# Patient Record
Sex: FEMALE | Race: BLACK OR AFRICAN AMERICAN | NOT HISPANIC OR LATINO
[De-identification: names, ages, dates, MRNs, and addresses within clinical notes are randomized per-mention and may not be internally consistent; named-entity substitution may affect disease eponyms.]

---

## 2017-04-19 PROBLEM — Z00.00 ENCOUNTER FOR PREVENTIVE HEALTH EXAMINATION: Status: ACTIVE | Noted: 2017-04-19

## 2017-04-21 ENCOUNTER — APPOINTMENT (OUTPATIENT)
Dept: PULMONOLOGY | Facility: CLINIC | Age: 52
End: 2017-04-21

## 2017-05-09 ENCOUNTER — APPOINTMENT (OUTPATIENT)
Dept: OPHTHALMOLOGY | Facility: CLINIC | Age: 52
End: 2017-05-09

## 2018-08-30 ENCOUNTER — APPOINTMENT (OUTPATIENT)
Dept: PULMONOLOGY | Facility: CLINIC | Age: 53
End: 2018-08-30
Payer: COMMERCIAL

## 2018-08-30 VITALS
TEMPERATURE: 97.8 F | BODY MASS INDEX: 48.82 KG/M2 | DIASTOLIC BLOOD PRESSURE: 80 MMHG | HEIGHT: 65 IN | SYSTOLIC BLOOD PRESSURE: 130 MMHG | OXYGEN SATURATION: 92 % | WEIGHT: 293 LBS | RESPIRATION RATE: 16 BRPM | HEART RATE: 91 BPM

## 2018-08-30 DIAGNOSIS — Z87.09 PERSONAL HISTORY OF OTHER DISEASES OF THE RESPIRATORY SYSTEM: ICD-10-CM

## 2018-08-30 DIAGNOSIS — Z86.39 PERSONAL HISTORY OF OTHER ENDOCRINE, NUTRITIONAL AND METABOLIC DISEASE: ICD-10-CM

## 2018-08-30 DIAGNOSIS — M54.9 DORSALGIA, UNSPECIFIED: ICD-10-CM

## 2018-08-30 PROCEDURE — 94727 GAS DIL/WSHOT DETER LNG VOL: CPT

## 2018-08-30 PROCEDURE — 94060 EVALUATION OF WHEEZING: CPT

## 2018-08-30 PROCEDURE — 94729 DIFFUSING CAPACITY: CPT

## 2018-08-30 PROCEDURE — 99204 OFFICE O/P NEW MOD 45 MIN: CPT | Mod: 25

## 2018-08-31 PROBLEM — Z86.39 HISTORY OF TYPE 2 DIABETES MELLITUS: Status: RESOLVED | Noted: 2018-08-31 | Resolved: 2018-08-31

## 2018-08-31 PROBLEM — Z87.09 HISTORY OF ASTHMA: Status: RESOLVED | Noted: 2018-08-31 | Resolved: 2018-08-31

## 2018-08-31 PROBLEM — M54.9 BACK PAIN: Status: ACTIVE | Noted: 2018-08-31

## 2018-08-31 RX ORDER — PEN NEEDLE, DIABETIC 29 G X1/2"
31G X 8 MM NEEDLE, DISPOSABLE MISCELLANEOUS
Qty: 270 | Refills: 0 | Status: ACTIVE | COMMUNITY
Start: 2018-04-13

## 2018-08-31 RX ORDER — TRAMADOL HYDROCHLORIDE 50 MG/1
50 TABLET, COATED ORAL
Qty: 30 | Refills: 0 | Status: ACTIVE | COMMUNITY
Start: 2018-02-28

## 2018-08-31 RX ORDER — CICLOPIROX 80 MG/ML
8 SOLUTION TOPICAL
Qty: 6 | Refills: 0 | Status: ACTIVE | COMMUNITY
Start: 2018-08-08

## 2018-08-31 RX ORDER — INSULIN GLARGINE 100 [IU]/ML
100 INJECTION, SOLUTION SUBCUTANEOUS
Qty: 45 | Refills: 0 | Status: ACTIVE | COMMUNITY
Start: 2018-04-13

## 2018-08-31 RX ORDER — LABETALOL HYDROCHLORIDE 200 MG/1
200 TABLET, FILM COATED ORAL
Qty: 240 | Refills: 0 | Status: COMPLETED | COMMUNITY
Start: 2018-08-06

## 2018-08-31 RX ORDER — ERGOCALCIFEROL 1.25 MG/1
1.25 MG CAPSULE, LIQUID FILLED ORAL
Qty: 12 | Refills: 0 | Status: ACTIVE | COMMUNITY
Start: 2018-04-16

## 2018-08-31 RX ORDER — SITAGLIPTIN 100 MG/1
100 TABLET, FILM COATED ORAL
Qty: 90 | Refills: 0 | Status: ACTIVE | COMMUNITY
Start: 2018-04-13

## 2018-08-31 RX ORDER — ATORVASTATIN CALCIUM 40 MG/1
40 TABLET, FILM COATED ORAL
Qty: 30 | Refills: 0 | Status: ACTIVE | COMMUNITY
Start: 2018-08-06

## 2018-08-31 RX ORDER — HYDROCORTISONE 1 %
12 CREAM (GRAM) TOPICAL
Qty: 400 | Refills: 0 | Status: COMPLETED | COMMUNITY
Start: 2018-08-08

## 2018-08-31 RX ORDER — CLONIDINE 0.3 MG/24H
0.3 PATCH, EXTENDED RELEASE TRANSDERMAL
Qty: 12 | Refills: 0 | Status: ACTIVE | COMMUNITY
Start: 2018-04-13

## 2018-08-31 RX ORDER — POTASSIUM CHLORIDE 1500 MG/1
20 TABLET, EXTENDED RELEASE ORAL
Qty: 90 | Refills: 0 | Status: ACTIVE | COMMUNITY
Start: 2018-04-13

## 2018-08-31 RX ORDER — TELMISARTAN 80 MG/1
80 TABLET ORAL
Qty: 90 | Refills: 0 | Status: ACTIVE | COMMUNITY
Start: 2018-04-13

## 2018-09-17 ENCOUNTER — CLINICAL ADVICE (OUTPATIENT)
Age: 53
End: 2018-09-17

## 2019-01-27 ENCOUNTER — RX RENEWAL (OUTPATIENT)
Age: 54
End: 2019-01-27

## 2019-04-05 ENCOUNTER — APPOINTMENT (OUTPATIENT)
Dept: PULMONOLOGY | Facility: CLINIC | Age: 54
End: 2019-04-05
Payer: COMMERCIAL

## 2019-04-05 VITALS
BODY MASS INDEX: 50.26 KG/M2 | RESPIRATION RATE: 16 BRPM | WEIGHT: 293 LBS | DIASTOLIC BLOOD PRESSURE: 100 MMHG | SYSTOLIC BLOOD PRESSURE: 174 MMHG | HEART RATE: 92 BPM | TEMPERATURE: 99.1 F | OXYGEN SATURATION: 91 %

## 2019-04-05 PROCEDURE — 99215 OFFICE O/P EST HI 40 MIN: CPT | Mod: 25

## 2019-04-05 PROCEDURE — 94727 GAS DIL/WSHOT DETER LNG VOL: CPT

## 2019-04-05 PROCEDURE — 94729 DIFFUSING CAPACITY: CPT

## 2019-04-05 PROCEDURE — 94060 EVALUATION OF WHEEZING: CPT

## 2019-04-05 RX ORDER — ALBUTEROL SULFATE 90 UG/1
108 (90 BASE) AEROSOL, METERED RESPIRATORY (INHALATION) EVERY 6 HOURS
Qty: 1 | Refills: 5 | Status: ACTIVE | COMMUNITY
Start: 2019-04-05 | End: 1900-01-01

## 2019-04-05 NOTE — PROCEDURE
[FreeTextEntry1] : Pulmonary function testing was performed on 8/30/18. There was no obstruction. There was moderate restriction. In addition there was a reduction in diffusion which was in relation to the loss of lung volume. No significant change was noted after inhalation of albuterol.\par \par Pulmonary function test performed April 5, 2019. No obstruction noted. Moderate restriction was present. Diffusion was reduced in relation to the loss of lung volume. No significant changes noted after inhalation of bronchodilator.\par \par Chest CT performed on September 26, 2018 demonstrated cardiomegaly, coronary artery calcifications, ectasia of the ascending aorta and ground glass densities in the right lung.\par \par Chest radiograph from March 28, 2019 reported a mild CHF pattern.

## 2019-04-05 NOTE — PHYSICAL EXAM
[General Appearance - In No Acute Distress] : no acute distress [Heart Sounds] : normal S1 and S2 [Auscultation Breath Sounds / Voice Sounds] : lungs were clear to auscultation bilaterally [Abdomen Tenderness] : non-tender [Abnormal Walk] : normal gait [Nail Clubbing] : no clubbing of the fingernails [Cyanosis, Localized] : no localized cyanosis [No Focal Deficits] : no focal deficits [Oriented To Time, Place, And Person] : oriented to person, place, and time [Impaired Insight] : insight and judgment were intact [Neck Cervical Mass (___cm)] : no neck mass was observed [3+ Pitting] : 3+  pitting  [Skin Turgor] : normal skin turgor [Erythema] : no erythema of the pharynx

## 2019-04-05 NOTE — HISTORY OF PRESENT ILLNESS
[FreeTextEntry1] : She was recently hospitalized in [place] for congestive heart failure. She received intravenous diuresis and responded well. No longer feeling short of breath but does have a cough when she lies down at night. Cough is nonproductive. No constitutional symptoms. No chest pain, pressure or palpitations. Continues to have mild leg edema.

## 2019-04-05 NOTE — DISCUSSION/SUMMARY
[FreeTextEntry1] : She is a 53 year-old woman with a history of CHF, HTN, DM, obesity and asthma.\par \par Her asthma is not active at the present time.For her asthma she is to continue with Breo and Ventolin.\par \par She was recently hospitalized for congestive heart failure. The radiographic findings noted on her chest CT require followup. Interstitial lung disease needs to be ruled out. She was advised to follow up with her cardiologist Dr. Randolph in [place]. I will see her again after the chest CT findings are available.

## 2019-04-05 NOTE — REVIEW OF SYSTEMS
[Back Pain] : ~T back pain [Diabetes] : diabetes mellitus [DVT] : DVT [Cough] : cough [PND] : PND [Fever] : no fever [Chills] : no chills [Sputum] : not coughing up ~M sputum [Hemoptysis] : no hemoptysis [Dyspnea] : no dyspnea [Chest Tightness] : no chest tightness [Pleuritic Pain] : no pleuritic pain [Wheezing] : no wheezing [Chest Discomfort] : no chest discomfort [Palpitations] : no palpitations [Edema] : ~T edema was not present [Nasal Discharge] : no nasal discharge [Heartburn] : no heartburn [Reflux] : no reflux [Dysuria] : no dysuria [Rash] : no [unfilled] rash [Anemia] : no anemia [Thyroid Problem] : no thyroid problem [Snoring] : no snoring

## 2019-07-18 ENCOUNTER — APPOINTMENT (OUTPATIENT)
Dept: PULMONOLOGY | Facility: CLINIC | Age: 54
End: 2019-07-18

## 2019-08-12 ENCOUNTER — APPOINTMENT (OUTPATIENT)
Dept: OPHTHALMOLOGY | Facility: CLINIC | Age: 54
End: 2019-08-12
Payer: COMMERCIAL

## 2019-08-12 ENCOUNTER — NON-APPOINTMENT (OUTPATIENT)
Age: 54
End: 2019-08-12

## 2019-08-12 PROCEDURE — 92250 FUNDUS PHOTOGRAPHY W/I&R: CPT

## 2019-08-12 PROCEDURE — 92014 COMPRE OPH EXAM EST PT 1/>: CPT

## 2019-08-12 PROCEDURE — 92015 DETERMINE REFRACTIVE STATE: CPT

## 2019-10-21 ENCOUNTER — APPOINTMENT (OUTPATIENT)
Dept: OPHTHALMOLOGY | Facility: CLINIC | Age: 54
End: 2019-10-21

## 2019-10-28 ENCOUNTER — APPOINTMENT (OUTPATIENT)
Dept: OPHTHALMOLOGY | Facility: CLINIC | Age: 54
End: 2019-10-28

## 2020-03-20 ENCOUNTER — APPOINTMENT (OUTPATIENT)
Dept: PULMONOLOGY | Facility: CLINIC | Age: 55
End: 2020-03-20
Payer: COMMERCIAL

## 2020-03-20 VITALS
RESPIRATION RATE: 16 BRPM | HEART RATE: 92 BPM | SYSTOLIC BLOOD PRESSURE: 148 MMHG | TEMPERATURE: 98.5 F | DIASTOLIC BLOOD PRESSURE: 81 MMHG | OXYGEN SATURATION: 93 %

## 2020-03-20 PROCEDURE — 99214 OFFICE O/P EST MOD 30 MIN: CPT

## 2020-03-20 RX ORDER — LABETALOL HYDROCHLORIDE 300 MG/1
300 TABLET, FILM COATED ORAL
Qty: 360 | Refills: 0 | Status: COMPLETED | COMMUNITY
Start: 2018-04-13 | End: 2020-03-20

## 2020-03-20 RX ORDER — ALBUTEROL SULFATE 90 UG/1
108 (90 BASE) AEROSOL, METERED RESPIRATORY (INHALATION) EVERY 6 HOURS
Qty: 1 | Refills: 2 | Status: COMPLETED | COMMUNITY
Start: 2019-01-27 | End: 2020-03-20

## 2020-03-20 RX ORDER — FUROSEMIDE 80 MG/1
80 TABLET ORAL
Qty: 90 | Refills: 0 | Status: COMPLETED | COMMUNITY
Start: 2018-04-18 | End: 2020-03-20

## 2020-03-20 RX ORDER — HYDRALAZINE HYDROCHLORIDE 100 MG/1
100 TABLET ORAL
Qty: 270 | Refills: 0 | Status: COMPLETED | COMMUNITY
Start: 2018-04-13 | End: 2020-03-20

## 2020-03-20 NOTE — HISTORY OF PRESENT ILLNESS
[FreeTextEntry1] : She was recently hospitalized in [place] for congestive heart failure. She received intravenous diuresis and responded well. No longer feeling short of breath but does have a cough when she lies down at night. Cough is nonproductive. No constitutional symptoms. No chest pain, pressure or palpitations. Continues to have mild leg edema.\par \par ==\par \par Was recently in the hospital for SOB and CHF. Her medications were adjusted. Was advised to have follow up for sleep apnea. She has a history of PRECIOUS. She had a CPAP machine in the past.

## 2020-03-20 NOTE — PHYSICAL EXAM
[General Appearance - In No Acute Distress] : no acute distress [Neck Cervical Mass (___cm)] : no neck mass was observed [Heart Sounds] : normal S1 and S2 [Auscultation Breath Sounds / Voice Sounds] : lungs were clear to auscultation bilaterally [Abdomen Tenderness] : non-tender [Abnormal Walk] : normal gait [Nail Clubbing] : no clubbing of the fingernails (4) walks frequently [Cyanosis, Localized] : no localized cyanosis [3+ Pitting] : 3+  pitting  [Skin Turgor] : normal skin turgor [No Focal Deficits] : no focal deficits [Oriented To Time, Place, And Person] : oriented to person, place, and time [Impaired Insight] : insight and judgment were intact [Erythema] : no erythema of the pharynx

## 2020-03-20 NOTE — REVIEW OF SYSTEMS
[Cough] : cough [PND] : PND [Back Pain] : ~T back pain [Diabetes] : diabetes mellitus [DVT] : DVT [Snoring] : snoring [Witnessed Apneas] : demonstrated ~M apnea [Nonrestorative Sleep] : nonrestorative sleep [Hypersomnolence] : sleeping much more than usual [Awakes With Dry Mouth] : awakes with dry mouth [Fever] : no fever [Chills] : no chills [Sputum] : not coughing up ~M sputum [Hemoptysis] : no hemoptysis [Dyspnea] : no dyspnea [Chest Tightness] : no chest tightness [Pleuritic Pain] : no pleuritic pain [Wheezing] : no wheezing [Chest Discomfort] : no chest discomfort [Palpitations] : no palpitations [Edema] : ~T edema was not present [Nasal Discharge] : no nasal discharge [Heartburn] : no heartburn [Reflux] : no reflux [Dysuria] : no dysuria [Rash] : no [unfilled] rash [Anemia] : no anemia [Thyroid Problem] : no thyroid problem [Difficulty Maintaining Sleep] : no difficulty maintaining sleep [Awakes With Headache] : awakes without a headache

## 2020-03-20 NOTE — DISCUSSION/SUMMARY
[FreeTextEntry1] : She is a 54 year-old woman with a history of CHF, HTN, DM, obesity and asthma. Also has a history of PRECIOUS. Also has had ground glass opacities on Chest CT. \par \par Has had CPAP but not using it. Her machine is old and not in working order. \par \par Will get a sleep study now. \par \par Follow up Chest CT. \par \par Follow up in one month.

## 2020-03-20 NOTE — PROCEDURE
[FreeTextEntry1] : PFT 8/30/18: There was no obstruction. There was moderate restriction. In addition there was a reduction in diffusion which was in relation to the loss of lung volume. No significant change was noted after inhalation of albuterol.\par \par PFT 4/5/19: No obstruction noted. Moderate restriction was present. Diffusion was reduced in relation to the loss of lung volume. No significant changes noted after inhalation of bronchodilator.\par \par CT Chest 9/26/18: Demonstrated cardiomegaly, coronary artery calcifications, ectasia of the ascending aorta and ground glass densities in the right lung.\par \par Chest radiograph from March 28, 2019 reported a mild CHF pattern.

## 2020-03-20 NOTE — REASON FOR VISIT
[Consultation] : a consultation visit [Asthma] : asthma [Follow-Up - From Hospitalization] : a follow-up visit after a recent hospitalization

## 2020-04-13 ENCOUNTER — APPOINTMENT (OUTPATIENT)
Dept: PULMONOLOGY | Facility: CLINIC | Age: 55
End: 2020-04-13
Payer: COMMERCIAL

## 2020-04-13 VITALS
BODY MASS INDEX: 48.82 KG/M2 | RESPIRATION RATE: 16 BRPM | HEART RATE: 95 BPM | TEMPERATURE: 98.2 F | DIASTOLIC BLOOD PRESSURE: 71 MMHG | SYSTOLIC BLOOD PRESSURE: 148 MMHG | WEIGHT: 293 LBS | HEIGHT: 65 IN | OXYGEN SATURATION: 91 %

## 2020-04-13 VITALS — OXYGEN SATURATION: 98 %

## 2020-04-13 PROCEDURE — 99215 OFFICE O/P EST HI 40 MIN: CPT

## 2020-04-13 RX ORDER — TRIAMTERENE AND HYDROCHLOROTHIAZIDE 25; 37.5 MG/1; MG/1
37.5-25 TABLET ORAL
Refills: 0 | Status: COMPLETED | COMMUNITY
End: 2020-04-13

## 2020-04-13 RX ORDER — INSULIN HUMAN 100 [IU]/ML
(70-30) 100 INJECTION, SUSPENSION SUBCUTANEOUS
Qty: 30 | Refills: 0 | Status: COMPLETED | COMMUNITY
Start: 2017-07-18 | End: 2020-04-13

## 2020-04-13 RX ORDER — HYDRALAZINE HYDROCHLORIDE 50 MG/1
50 TABLET ORAL
Refills: 0 | Status: ACTIVE | COMMUNITY

## 2020-04-13 RX ORDER — LABETALOL HYDROCHLORIDE 300 MG/1
300 TABLET, FILM COATED ORAL
Refills: 0 | Status: ACTIVE | COMMUNITY

## 2020-04-13 RX ORDER — LABETALOL HYDROCHLORIDE 100 MG/1
100 TABLET, FILM COATED ORAL
Refills: 0 | Status: COMPLETED | COMMUNITY
End: 2020-04-13

## 2020-04-13 RX ORDER — HYDRALAZINE HYDROCHLORIDE 25 MG/1
25 TABLET ORAL
Refills: 0 | Status: COMPLETED | COMMUNITY
End: 2020-04-13

## 2020-04-13 RX ORDER — FUROSEMIDE 80 MG/1
80 TABLET ORAL
Refills: 0 | Status: ACTIVE | COMMUNITY

## 2020-04-13 NOTE — HISTORY OF PRESENT ILLNESS
[FreeTextEntry1] : She was hospitalized at [facility] on 3/26/20 for hypertensive emergency with CHF. Was COVID negative. Her medications were adjusted. \par \par Has not had a PSG as of yet. She has a history of PRECIOUS. She had a CPAP machine in the past.

## 2020-04-13 NOTE — REVIEW OF SYSTEMS
[Cough] : cough [PND] : PND [Back Pain] : ~T back pain [Diabetes] : diabetes mellitus [DVT] : DVT [Snoring] : snoring [Witnessed Apneas] : demonstrated ~M apnea [Nonrestorative Sleep] : nonrestorative sleep [Awakes With Dry Mouth] : awakes with dry mouth [Hypersomnolence] : sleeping much more than usual [Fever] : no fever [Chills] : no chills [Sputum] : not coughing up ~M sputum [Hemoptysis] : no hemoptysis [Dyspnea] : no dyspnea [Chest Tightness] : no chest tightness [Pleuritic Pain] : no pleuritic pain [Wheezing] : no wheezing [Chest Discomfort] : no chest discomfort [Palpitations] : no palpitations [Edema] : ~T edema was not present [Nasal Discharge] : no nasal discharge [Heartburn] : no heartburn [Reflux] : no reflux [Dysuria] : no dysuria [Rash] : no [unfilled] rash [Anemia] : no anemia [Thyroid Problem] : no thyroid problem [Difficulty Maintaining Sleep] : no difficulty maintaining sleep [Awakes With Headache] : awakes without a headache

## 2020-04-13 NOTE — REASON FOR VISIT
[Follow-Up - From Hospitalization] : a follow-up visit after a recent hospitalization [Follow-Up] : a follow-up visit [Sleep Apnea] : sleep apnea

## 2020-04-13 NOTE — PROCEDURE
[FreeTextEntry1] : PFT 8/30/18: There was no obstruction. There was moderate restriction. In addition there was a reduction in diffusion which was in relation to the loss of lung volume. No significant change was noted after inhalation of albuterol.\par \par PFT 4/5/19: No obstruction noted. Moderate restriction was present. Diffusion was reduced in relation to the loss of lung volume. No significant changes noted after inhalation of bronchodilator.\par \par Chest radiograph from March 28, 2019 reported a mild CHF pattern.\par \par CT Chest 9/26/18: Demonstrated cardiomegaly, coronary artery calcifications, ectasia of the ascending aorta and ground glass densities in the right lung.\par \par CT Chest (angiogram) - 3/26/20: No PE. Patchy ground glass nodular opacities in the RLL and LLL. There were also some solid appearing nodules consistent with intraparenchymal lymph nodes. No pleural effusion. \par \par Echocardiogram - 3/36/20: EF 45-50%. Mild diffuse hypokinesis. Concentric LVH. Grade I diastolic dysfunction. RVSP could not be assessed. \par \par

## 2020-04-13 NOTE — DISCUSSION/SUMMARY
[FreeTextEntry1] : She is a 54 year-old woman with a history of CHF, HTN, DM, HFpEF, obesity, asthma and PRECIOUS. Has not been on CPAP. She has a machine but it is not working. She also has had ground glass opacities on Chest CT as noted. She was hospitalized in March 2020 for a hypertensive emergency and CHF. PE was ruled out. \par \par Her BP is now better. To continue with the current medications and follow up with Cardiology. \par \par For her PRECIOUS a follow up sleep study is awaited. Has not scheduled it as of yet. \par \par For the GGO on Chest CT a follow up CT of the Chest in 6 months. \par \par Weight loss suggested. Advised to consider bariatric surgery. Has been evaluated in the past but turned down. \par \par Follow up with me after the sleep study has been obtained.

## 2020-04-13 NOTE — PHYSICAL EXAM
[General Appearance - In No Acute Distress] : no acute distress [Neck Cervical Mass (___cm)] : no neck mass was observed [Heart Sounds] : normal S1 and S2 [Auscultation Breath Sounds / Voice Sounds] : lungs were clear to auscultation bilaterally [Abdomen Tenderness] : non-tender [Abnormal Walk] : normal gait [Nail Clubbing] : no clubbing of the fingernails [Cyanosis, Localized] : no localized cyanosis [3+ Pitting] : 3+  pitting  [Skin Turgor] : normal skin turgor [No Focal Deficits] : no focal deficits [Oriented To Time, Place, And Person] : oriented to person, place, and time [Impaired Insight] : insight and judgment were intact [Normal Oropharynx] : normal oropharynx [Erythema] : no erythema of the pharynx

## 2020-04-30 ENCOUNTER — APPOINTMENT (OUTPATIENT)
Dept: PULMONOLOGY | Facility: CLINIC | Age: 55
End: 2020-04-30

## 2020-11-09 ENCOUNTER — APPOINTMENT (OUTPATIENT)
Dept: PULMONOLOGY | Facility: CLINIC | Age: 55
End: 2020-11-09
Payer: COMMERCIAL

## 2020-11-09 VITALS
DIASTOLIC BLOOD PRESSURE: 88 MMHG | BODY MASS INDEX: 52.09 KG/M2 | RESPIRATION RATE: 17 BRPM | WEIGHT: 293 LBS | TEMPERATURE: 97 F | OXYGEN SATURATION: 94 % | SYSTOLIC BLOOD PRESSURE: 180 MMHG | HEART RATE: 91 BPM

## 2020-11-09 DIAGNOSIS — Z86.69 PERSONAL HISTORY OF OTHER DISEASES OF THE NERVOUS SYSTEM AND SENSE ORGANS: ICD-10-CM

## 2020-11-09 DIAGNOSIS — G47.33 OBSTRUCTIVE SLEEP APNEA (ADULT) (PEDIATRIC): ICD-10-CM

## 2020-11-09 PROCEDURE — 99214 OFFICE O/P EST MOD 30 MIN: CPT

## 2020-11-09 PROCEDURE — 99072 ADDL SUPL MATRL&STAF TM PHE: CPT

## 2020-11-09 NOTE — REVIEW OF SYSTEMS
[PND] : PND [Back Pain] : ~T back pain [Diabetes] : diabetes mellitus [DVT] : DVT [Snoring] : snoring [Witnessed Apneas] : demonstrated ~M apnea [Nonrestorative Sleep] : nonrestorative sleep [Awakes With Dry Mouth] : awakes with dry mouth [Hypersomnolence] : sleeping much more than usual [Fever] : no fever [Cough] : no cough [Dyspnea] : no dyspnea [Wheezing] : no wheezing [Chest Discomfort] : no chest discomfort [Palpitations] : no palpitations [Edema] : ~T edema was not present [Nasal Discharge] : no nasal discharge [Heartburn] : no heartburn [Reflux] : no reflux [Dysuria] : no dysuria [Rash] : no [unfilled] rash [Anemia] : no anemia [Thyroid Problem] : no thyroid problem [Difficulty Maintaining Sleep] : no difficulty maintaining sleep [Awakes With Headache] : awakes without a headache

## 2020-11-09 NOTE — PHYSICAL EXAM
[General Appearance - In No Acute Distress] : no acute distress [Neck Cervical Mass (___cm)] : no neck mass was observed [Heart Sounds] : normal S1 and S2 [Auscultation Breath Sounds / Voice Sounds] : lungs were clear to auscultation bilaterally [Abdomen Tenderness] : non-tender [Abnormal Walk] : normal gait [Nail Clubbing] : no clubbing of the fingernails [Cyanosis, Localized] : no localized cyanosis [3+ Pitting] : 3+  pitting  [Skin Turgor] : normal skin turgor [No Focal Deficits] : no focal deficits [Oriented To Time, Place, And Person] : oriented to person, place, and time [IV] : IV

## 2020-11-09 NOTE — DISCUSSION/SUMMARY
[FreeTextEntry1] : She is a 54 year-old woman with a history of CHF, HTN, DM, HFpEF, obesity, asthma and PRECIOUS. Has not been on CPAP. She has had a machine but it is not working. She also has had ground glass opacities on Chest CT as noted. She was hospitalized in March 2020 for a hypertensive emergency and CHF. PE was ruled out. \par \par She has severe PRECIOUS. \par \par Will start on Auto - CPAP of 4 to 12 cm of water.

## 2020-11-09 NOTE — PROCEDURE
[FreeTextEntry1] : PFT 8/30/18: There was no obstruction. There was moderate restriction. In addition there was a reduction in diffusion which was in relation to the loss of lung volume. No significant change was noted after inhalation of albuterol.\par \par PFT 4/5/19: No obstruction noted. Moderate restriction was present. Diffusion was reduced in relation to the loss of lung volume. No significant changes noted after inhalation of bronchodilator.\par \par Chest radiograph from March 28, 2019 reported a mild CHF pattern.\par \par CT Chest 9/26/18: Demonstrated cardiomegaly, coronary artery calcifications, ectasia of the ascending aorta and ground glass densities in the right lung.\par \par CT Chest (angiogram) - 3/26/20: No PE. Patchy ground glass nodular opacities in the RLL and LLL. There were also some solid appearing nodules consistent with intraparenchymal lymph nodes. No pleural effusion. \par \par PSG 10/24/20: Severe PRECIOUS with AHI of 61. \par \par Echocardiogram - 3/36/20: EF 45-50%. Mild diffuse hypokinesis. Concentric LVH. Grade I diastolic dysfunction. RVSP could not be assessed. \par \par

## 2021-02-22 ENCOUNTER — APPOINTMENT (OUTPATIENT)
Dept: PULMONOLOGY | Facility: CLINIC | Age: 56
End: 2021-02-22

## 2021-08-10 ENCOUNTER — APPOINTMENT (OUTPATIENT)
Dept: GASTROENTEROLOGY | Facility: CLINIC | Age: 56
End: 2021-08-10
Payer: COMMERCIAL

## 2021-08-10 VITALS
TEMPERATURE: 98.1 F | SYSTOLIC BLOOD PRESSURE: 158 MMHG | WEIGHT: 293 LBS | HEIGHT: 65 IN | BODY MASS INDEX: 48.82 KG/M2 | DIASTOLIC BLOOD PRESSURE: 90 MMHG

## 2021-08-10 DIAGNOSIS — K59.00 CONSTIPATION, UNSPECIFIED: ICD-10-CM

## 2021-08-10 DIAGNOSIS — K21.9 GASTRO-ESOPHAGEAL REFLUX DISEASE W/OUT ESOPHAGITIS: ICD-10-CM

## 2021-08-10 PROCEDURE — 99204 OFFICE O/P NEW MOD 45 MIN: CPT

## 2021-08-10 RX ORDER — PANTOPRAZOLE 40 MG/1
40 TABLET, DELAYED RELEASE ORAL
Qty: 30 | Refills: 5 | Status: ACTIVE | COMMUNITY
Start: 2021-08-10 | End: 1900-01-01

## 2021-08-10 RX ORDER — LINACLOTIDE 145 UG/1
145 CAPSULE, GELATIN COATED ORAL
Qty: 30 | Refills: 3 | Status: ACTIVE | COMMUNITY
Start: 2021-08-10 | End: 1900-01-01

## 2021-08-10 NOTE — PHYSICAL EXAM
[General Appearance - Alert] : alert [General Appearance - In No Acute Distress] : in no acute distress [Sclera] : the sclera and conjunctiva were normal [PERRL With Normal Accommodation] : pupils were equal in size, round, and reactive to light [Extraocular Movements] : extraocular movements were intact [Outer Ear] : the ears and nose were normal in appearance [Oropharynx] : the oropharynx was normal [Neck Appearance] : the appearance of the neck was normal [Neck Cervical Mass (___cm)] : no neck mass was observed [Jugular Venous Distention Increased] : there was no jugular-venous distention [Thyroid Diffuse Enlargement] : the thyroid was not enlarged [Thyroid Nodule] : there were no palpable thyroid nodules [Auscultation Breath Sounds / Voice Sounds] : lungs were clear to auscultation bilaterally [Abnormal Walk] : normal gait [Nail Clubbing] : no clubbing  or cyanosis of the fingernails [Musculoskeletal - Swelling] : no joint swelling seen [Motor Tone] : muscle strength and tone were normal [Skin Color & Pigmentation] : normal skin color and pigmentation [Skin Turgor] : normal skin turgor [] : no rash [Deep Tendon Reflexes (DTR)] : deep tendon reflexes were 2+ and symmetric [Sensation] : the sensory exam was normal to light touch and pinprick [No Focal Deficits] : no focal deficits

## 2021-08-10 NOTE — CONSULT LETTER
[Dear  ___] : Dear  [unfilled], [Consult Letter:] : I had the pleasure of evaluating your patient, [unfilled]. [( Thank you for referring [unfilled] for consultation for _____ )] : Thank you for referring [unfilled] for consultation for [unfilled] [Please see my note below.] : Please see my note below. [Consult Closing:] : Thank you very much for allowing me to participate in the care of this patient.  If you have any questions, please do not hesitate to contact me. [Sincerely,] : Sincerely, [FreeTextEntry3] : Alonso Mckeon MD\par

## 2021-08-10 NOTE — HISTORY OF PRESENT ILLNESS
[FreeTextEntry1] : She .Is a 55-year-old female with chronic constipation.  Her last colonoscopy was 2014 which was normal.  She also admits to nausea and post prandial reflux of her food.  She denies abdominal pain

## 2021-08-10 NOTE — REVIEW OF SYSTEMS
[Constipation] : constipation [Heartburn] : heartburn [Negative] : Heme/Lymph [FreeTextEntry7] : Reflux Nausea

## 2021-08-10 NOTE — ASSESSMENT
[FreeTextEntry1] : Start Linzess 145 mcg daily in the morning\par Pantoprazole 40 mg daily 1 hour before breakfast\par \par Her high BMI and severe sleep apnea preclude endoscopic evaluation at this time\par \par Office follow-up in 3 to 4 weeks

## 2023-09-25 ENCOUNTER — APPOINTMENT (OUTPATIENT)
Dept: PULMONOLOGY | Facility: CLINIC | Age: 58
End: 2023-09-25
Payer: COMMERCIAL

## 2023-09-25 VITALS
TEMPERATURE: 97.7 F | BODY MASS INDEX: 47.43 KG/M2 | HEART RATE: 103 BPM | OXYGEN SATURATION: 93 % | SYSTOLIC BLOOD PRESSURE: 192 MMHG | DIASTOLIC BLOOD PRESSURE: 110 MMHG | WEIGHT: 285 LBS

## 2023-09-25 VITALS — SYSTOLIC BLOOD PRESSURE: 182 MMHG | DIASTOLIC BLOOD PRESSURE: 102 MMHG

## 2023-09-25 DIAGNOSIS — R06.02 SHORTNESS OF BREATH: ICD-10-CM

## 2023-09-25 DIAGNOSIS — R93.89 ABNORMAL FINDINGS ON DIAGNOSTIC IMAGING OF OTHER SPECIFIED BODY STRUCTURES: ICD-10-CM

## 2023-09-25 DIAGNOSIS — Z99.89 OBSTRUCTIVE SLEEP APNEA (ADULT) (PEDIATRIC): ICD-10-CM

## 2023-09-25 DIAGNOSIS — G47.33 OBSTRUCTIVE SLEEP APNEA (ADULT) (PEDIATRIC): ICD-10-CM

## 2023-09-25 PROCEDURE — 99214 OFFICE O/P EST MOD 30 MIN: CPT

## 2023-10-26 ENCOUNTER — APPOINTMENT (OUTPATIENT)
Dept: PULMONOLOGY | Facility: CLINIC | Age: 58
End: 2023-10-26

## 2024-01-13 ENCOUNTER — INPATIENT (INPATIENT)
Facility: HOSPITAL | Age: 59
LOS: 5 days | Discharge: HOME | End: 2024-01-19
Attending: GENERAL ACUTE CARE HOSPITAL | Admitting: GENERAL ACUTE CARE HOSPITAL
Payer: COMMERCIAL

## 2024-01-13 VITALS
HEIGHT: 67 IN | HEART RATE: 94 BPM | RESPIRATION RATE: 16 BRPM | SYSTOLIC BLOOD PRESSURE: 192 MMHG | WEIGHT: 273.37 LBS | OXYGEN SATURATION: 94 % | DIASTOLIC BLOOD PRESSURE: 128 MMHG | TEMPERATURE: 98 F

## 2024-01-13 LAB
ALBUMIN SERPL ELPH-MCNC: 3.1 G/DL — LOW (ref 3.3–5)
ALBUMIN SERPL ELPH-MCNC: 3.1 G/DL — LOW (ref 3.3–5)
ALP SERPL-CCNC: 93 U/L — SIGNIFICANT CHANGE UP (ref 40–120)
ALP SERPL-CCNC: 93 U/L — SIGNIFICANT CHANGE UP (ref 40–120)
ALT FLD-CCNC: 22 U/L — SIGNIFICANT CHANGE UP (ref 12–78)
ALT FLD-CCNC: 22 U/L — SIGNIFICANT CHANGE UP (ref 12–78)
ANION GAP SERPL CALC-SCNC: 7 MMOL/L — SIGNIFICANT CHANGE UP (ref 5–17)
ANION GAP SERPL CALC-SCNC: 7 MMOL/L — SIGNIFICANT CHANGE UP (ref 5–17)
APPEARANCE UR: CLEAR — SIGNIFICANT CHANGE UP
APPEARANCE UR: CLEAR — SIGNIFICANT CHANGE UP
APTT BLD: 28.6 SEC — SIGNIFICANT CHANGE UP (ref 24.5–35.6)
APTT BLD: 28.6 SEC — SIGNIFICANT CHANGE UP (ref 24.5–35.6)
AST SERPL-CCNC: 17 U/L — SIGNIFICANT CHANGE UP (ref 15–37)
AST SERPL-CCNC: 17 U/L — SIGNIFICANT CHANGE UP (ref 15–37)
BASOPHILS # BLD AUTO: 0.03 K/UL — SIGNIFICANT CHANGE UP (ref 0–0.2)
BASOPHILS # BLD AUTO: 0.03 K/UL — SIGNIFICANT CHANGE UP (ref 0–0.2)
BASOPHILS NFR BLD AUTO: 0.5 % — SIGNIFICANT CHANGE UP (ref 0–2)
BASOPHILS NFR BLD AUTO: 0.5 % — SIGNIFICANT CHANGE UP (ref 0–2)
BILIRUB SERPL-MCNC: 0.5 MG/DL — SIGNIFICANT CHANGE UP (ref 0.2–1.2)
BILIRUB SERPL-MCNC: 0.5 MG/DL — SIGNIFICANT CHANGE UP (ref 0.2–1.2)
BILIRUB UR-MCNC: NEGATIVE — SIGNIFICANT CHANGE UP
BILIRUB UR-MCNC: NEGATIVE — SIGNIFICANT CHANGE UP
BUN SERPL-MCNC: 21 MG/DL — SIGNIFICANT CHANGE UP (ref 7–23)
BUN SERPL-MCNC: 21 MG/DL — SIGNIFICANT CHANGE UP (ref 7–23)
CALCIUM SERPL-MCNC: 8.9 MG/DL — SIGNIFICANT CHANGE UP (ref 8.5–10.1)
CALCIUM SERPL-MCNC: 8.9 MG/DL — SIGNIFICANT CHANGE UP (ref 8.5–10.1)
CHLORIDE SERPL-SCNC: 101 MMOL/L — SIGNIFICANT CHANGE UP (ref 96–108)
CHLORIDE SERPL-SCNC: 101 MMOL/L — SIGNIFICANT CHANGE UP (ref 96–108)
CO2 SERPL-SCNC: 29 MMOL/L — SIGNIFICANT CHANGE UP (ref 22–31)
CO2 SERPL-SCNC: 29 MMOL/L — SIGNIFICANT CHANGE UP (ref 22–31)
COLOR SPEC: YELLOW — SIGNIFICANT CHANGE UP
COLOR SPEC: YELLOW — SIGNIFICANT CHANGE UP
CREAT SERPL-MCNC: 1.14 MG/DL — SIGNIFICANT CHANGE UP (ref 0.5–1.3)
CREAT SERPL-MCNC: 1.14 MG/DL — SIGNIFICANT CHANGE UP (ref 0.5–1.3)
DIFF PNL FLD: NEGATIVE — SIGNIFICANT CHANGE UP
DIFF PNL FLD: NEGATIVE — SIGNIFICANT CHANGE UP
EGFR: 55 ML/MIN/1.73M2 — LOW
EGFR: 55 ML/MIN/1.73M2 — LOW
EOSINOPHIL # BLD AUTO: 0.03 K/UL — SIGNIFICANT CHANGE UP (ref 0–0.5)
EOSINOPHIL # BLD AUTO: 0.03 K/UL — SIGNIFICANT CHANGE UP (ref 0–0.5)
EOSINOPHIL NFR BLD AUTO: 0.5 % — SIGNIFICANT CHANGE UP (ref 0–6)
EOSINOPHIL NFR BLD AUTO: 0.5 % — SIGNIFICANT CHANGE UP (ref 0–6)
GLUCOSE SERPL-MCNC: 532 MG/DL — CRITICAL HIGH (ref 70–99)
GLUCOSE SERPL-MCNC: 532 MG/DL — CRITICAL HIGH (ref 70–99)
GLUCOSE UR QL: >=1000 MG/DL
GLUCOSE UR QL: >=1000 MG/DL
HCT VFR BLD CALC: 45.6 % — HIGH (ref 34.5–45)
HCT VFR BLD CALC: 45.6 % — HIGH (ref 34.5–45)
HGB BLD-MCNC: 15.1 G/DL — SIGNIFICANT CHANGE UP (ref 11.5–15.5)
HGB BLD-MCNC: 15.1 G/DL — SIGNIFICANT CHANGE UP (ref 11.5–15.5)
IMM GRANULOCYTES NFR BLD AUTO: 0.2 % — SIGNIFICANT CHANGE UP (ref 0–0.9)
IMM GRANULOCYTES NFR BLD AUTO: 0.2 % — SIGNIFICANT CHANGE UP (ref 0–0.9)
INR BLD: 0.92 RATIO — SIGNIFICANT CHANGE UP (ref 0.85–1.18)
INR BLD: 0.92 RATIO — SIGNIFICANT CHANGE UP (ref 0.85–1.18)
KETONES UR-MCNC: NEGATIVE MG/DL — SIGNIFICANT CHANGE UP
KETONES UR-MCNC: NEGATIVE MG/DL — SIGNIFICANT CHANGE UP
LEUKOCYTE ESTERASE UR-ACNC: NEGATIVE — SIGNIFICANT CHANGE UP
LEUKOCYTE ESTERASE UR-ACNC: NEGATIVE — SIGNIFICANT CHANGE UP
LYMPHOCYTES # BLD AUTO: 1.86 K/UL — SIGNIFICANT CHANGE UP (ref 1–3.3)
LYMPHOCYTES # BLD AUTO: 1.86 K/UL — SIGNIFICANT CHANGE UP (ref 1–3.3)
LYMPHOCYTES # BLD AUTO: 28.7 % — SIGNIFICANT CHANGE UP (ref 13–44)
LYMPHOCYTES # BLD AUTO: 28.7 % — SIGNIFICANT CHANGE UP (ref 13–44)
MCHC RBC-ENTMCNC: 28.7 PG — SIGNIFICANT CHANGE UP (ref 27–34)
MCHC RBC-ENTMCNC: 28.7 PG — SIGNIFICANT CHANGE UP (ref 27–34)
MCHC RBC-ENTMCNC: 33.1 G/DL — SIGNIFICANT CHANGE UP (ref 32–36)
MCHC RBC-ENTMCNC: 33.1 G/DL — SIGNIFICANT CHANGE UP (ref 32–36)
MCV RBC AUTO: 86.5 FL — SIGNIFICANT CHANGE UP (ref 80–100)
MCV RBC AUTO: 86.5 FL — SIGNIFICANT CHANGE UP (ref 80–100)
MONOCYTES # BLD AUTO: 0.44 K/UL — SIGNIFICANT CHANGE UP (ref 0–0.9)
MONOCYTES # BLD AUTO: 0.44 K/UL — SIGNIFICANT CHANGE UP (ref 0–0.9)
MONOCYTES NFR BLD AUTO: 6.8 % — SIGNIFICANT CHANGE UP (ref 2–14)
MONOCYTES NFR BLD AUTO: 6.8 % — SIGNIFICANT CHANGE UP (ref 2–14)
NEUTROPHILS # BLD AUTO: 4.1 K/UL — SIGNIFICANT CHANGE UP (ref 1.8–7.4)
NEUTROPHILS # BLD AUTO: 4.1 K/UL — SIGNIFICANT CHANGE UP (ref 1.8–7.4)
NEUTROPHILS NFR BLD AUTO: 63.3 % — SIGNIFICANT CHANGE UP (ref 43–77)
NEUTROPHILS NFR BLD AUTO: 63.3 % — SIGNIFICANT CHANGE UP (ref 43–77)
NITRITE UR-MCNC: NEGATIVE — SIGNIFICANT CHANGE UP
NITRITE UR-MCNC: NEGATIVE — SIGNIFICANT CHANGE UP
NRBC # BLD: 0 /100 WBCS — SIGNIFICANT CHANGE UP (ref 0–0)
NRBC # BLD: 0 /100 WBCS — SIGNIFICANT CHANGE UP (ref 0–0)
PH UR: 6 — SIGNIFICANT CHANGE UP (ref 5–8)
PH UR: 6 — SIGNIFICANT CHANGE UP (ref 5–8)
PLATELET # BLD AUTO: 227 K/UL — SIGNIFICANT CHANGE UP (ref 150–400)
PLATELET # BLD AUTO: 227 K/UL — SIGNIFICANT CHANGE UP (ref 150–400)
POTASSIUM SERPL-MCNC: 4.2 MMOL/L — SIGNIFICANT CHANGE UP (ref 3.5–5.3)
POTASSIUM SERPL-MCNC: 4.2 MMOL/L — SIGNIFICANT CHANGE UP (ref 3.5–5.3)
POTASSIUM SERPL-SCNC: 4.2 MMOL/L — SIGNIFICANT CHANGE UP (ref 3.5–5.3)
POTASSIUM SERPL-SCNC: 4.2 MMOL/L — SIGNIFICANT CHANGE UP (ref 3.5–5.3)
PROT SERPL-MCNC: 6.8 GM/DL — SIGNIFICANT CHANGE UP (ref 6–8.3)
PROT SERPL-MCNC: 6.8 GM/DL — SIGNIFICANT CHANGE UP (ref 6–8.3)
PROT UR-MCNC: 100 MG/DL
PROT UR-MCNC: 100 MG/DL
PROTHROM AB SERPL-ACNC: 11.1 SEC — SIGNIFICANT CHANGE UP (ref 9.5–13)
PROTHROM AB SERPL-ACNC: 11.1 SEC — SIGNIFICANT CHANGE UP (ref 9.5–13)
RBC # BLD: 5.27 M/UL — HIGH (ref 3.8–5.2)
RBC # BLD: 5.27 M/UL — HIGH (ref 3.8–5.2)
RBC # FLD: 12.6 % — SIGNIFICANT CHANGE UP (ref 10.3–14.5)
RBC # FLD: 12.6 % — SIGNIFICANT CHANGE UP (ref 10.3–14.5)
RBC CASTS # UR COMP ASSIST: 3 /HPF — SIGNIFICANT CHANGE UP (ref 0–4)
RBC CASTS # UR COMP ASSIST: 3 /HPF — SIGNIFICANT CHANGE UP (ref 0–4)
SODIUM SERPL-SCNC: 137 MMOL/L — SIGNIFICANT CHANGE UP (ref 135–145)
SODIUM SERPL-SCNC: 137 MMOL/L — SIGNIFICANT CHANGE UP (ref 135–145)
SP GR SPEC: >1.03 — HIGH (ref 1–1.03)
SP GR SPEC: >1.03 — HIGH (ref 1–1.03)
TROPONIN I, HIGH SENSITIVITY RESULT: 91.7 NG/L — HIGH
TROPONIN I, HIGH SENSITIVITY RESULT: 91.7 NG/L — HIGH
UROBILINOGEN FLD QL: 0.2 MG/DL — SIGNIFICANT CHANGE UP (ref 0.2–1)
UROBILINOGEN FLD QL: 0.2 MG/DL — SIGNIFICANT CHANGE UP (ref 0.2–1)
WBC # BLD: 6.47 K/UL — SIGNIFICANT CHANGE UP (ref 3.8–10.5)
WBC # BLD: 6.47 K/UL — SIGNIFICANT CHANGE UP (ref 3.8–10.5)
WBC # FLD AUTO: 6.47 K/UL — SIGNIFICANT CHANGE UP (ref 3.8–10.5)
WBC # FLD AUTO: 6.47 K/UL — SIGNIFICANT CHANGE UP (ref 3.8–10.5)
WBC UR QL: 5 /HPF — SIGNIFICANT CHANGE UP (ref 0–5)
WBC UR QL: 5 /HPF — SIGNIFICANT CHANGE UP (ref 0–5)

## 2024-01-13 PROCEDURE — 70450 CT HEAD/BRAIN W/O DYE: CPT | Mod: 26,MA,77

## 2024-01-13 PROCEDURE — 99285 EMERGENCY DEPT VISIT HI MDM: CPT

## 2024-01-13 PROCEDURE — 70450 CT HEAD/BRAIN W/O DYE: CPT | Mod: 26,MA

## 2024-01-13 PROCEDURE — 99222 1ST HOSP IP/OBS MODERATE 55: CPT

## 2024-01-13 PROCEDURE — 93010 ELECTROCARDIOGRAM REPORT: CPT

## 2024-01-13 RX ORDER — ONDANSETRON 8 MG/1
4 TABLET, FILM COATED ORAL EVERY 8 HOURS
Refills: 0 | Status: DISCONTINUED | OUTPATIENT
Start: 2024-01-13 | End: 2024-01-19

## 2024-01-13 RX ORDER — METOCLOPRAMIDE HCL 10 MG
10 TABLET ORAL ONCE
Refills: 0 | Status: COMPLETED | OUTPATIENT
Start: 2024-01-13 | End: 2024-01-13

## 2024-01-13 RX ORDER — MORPHINE SULFATE 50 MG/1
4 CAPSULE, EXTENDED RELEASE ORAL ONCE
Refills: 0 | Status: DISCONTINUED | OUTPATIENT
Start: 2024-01-13 | End: 2024-01-13

## 2024-01-13 RX ORDER — ACETAMINOPHEN 500 MG
1000 TABLET ORAL ONCE
Refills: 0 | Status: COMPLETED | OUTPATIENT
Start: 2024-01-13 | End: 2024-01-13

## 2024-01-13 RX ORDER — SODIUM CHLORIDE 9 MG/ML
1000 INJECTION INTRAMUSCULAR; INTRAVENOUS; SUBCUTANEOUS ONCE
Refills: 0 | Status: COMPLETED | OUTPATIENT
Start: 2024-01-13 | End: 2024-01-13

## 2024-01-13 RX ORDER — INSULIN HUMAN 100 [IU]/ML
5 INJECTION, SOLUTION SUBCUTANEOUS ONCE
Refills: 0 | Status: DISCONTINUED | OUTPATIENT
Start: 2024-01-13 | End: 2024-01-13

## 2024-01-13 RX ORDER — LANOLIN ALCOHOL/MO/W.PET/CERES
3 CREAM (GRAM) TOPICAL AT BEDTIME
Refills: 0 | Status: DISCONTINUED | OUTPATIENT
Start: 2024-01-13 | End: 2024-01-19

## 2024-01-13 RX ORDER — LEVETIRACETAM 250 MG/1
500 TABLET, FILM COATED ORAL
Refills: 0 | Status: DISCONTINUED | OUTPATIENT
Start: 2024-01-13 | End: 2024-01-18

## 2024-01-13 RX ORDER — ACETAMINOPHEN 500 MG
650 TABLET ORAL EVERY 6 HOURS
Refills: 0 | Status: DISCONTINUED | OUTPATIENT
Start: 2024-01-13 | End: 2024-01-19

## 2024-01-13 RX ORDER — INSULIN HUMAN 100 [IU]/ML
5 INJECTION, SOLUTION SUBCUTANEOUS ONCE
Refills: 0 | Status: COMPLETED | OUTPATIENT
Start: 2024-01-13 | End: 2024-01-13

## 2024-01-13 RX ADMIN — MORPHINE SULFATE 4 MILLIGRAM(S): 50 CAPSULE, EXTENDED RELEASE ORAL at 20:34

## 2024-01-13 RX ADMIN — SODIUM CHLORIDE 1000 MILLILITER(S): 9 INJECTION INTRAMUSCULAR; INTRAVENOUS; SUBCUTANEOUS at 16:47

## 2024-01-13 RX ADMIN — Medication 10 MILLIGRAM(S): at 20:34

## 2024-01-13 RX ADMIN — MORPHINE SULFATE 4 MILLIGRAM(S): 50 CAPSULE, EXTENDED RELEASE ORAL at 21:00

## 2024-01-13 RX ADMIN — INSULIN HUMAN 5 UNIT(S): 100 INJECTION, SOLUTION SUBCUTANEOUS at 17:28

## 2024-01-13 RX ADMIN — Medication 1000 MILLIGRAM(S): at 19:18

## 2024-01-13 RX ADMIN — Medication 1000 MILLIGRAM(S): at 20:00

## 2024-01-13 RX ADMIN — Medication 400 MILLIGRAM(S): at 19:06

## 2024-01-13 NOTE — H&P ADULT - NSHPLABSRESULTS_GEN_ALL_CORE
15.1   6.47  )-----------( 227      ( 13 Jan 2024 16:20 )             45.6   01-13    137  |  101  |  21  ----------------------------<  532<HH>  4.2   |  29  |  1.14    Ca    8.9      13 Jan 2024 16:20    TPro  6.8  /  Alb  3.1<L>  /  TBili  0.5  /  DBili  x   /  AST  17  /  ALT  22  /  AlkPhos  93  01-13    Urinalysis Basic - ( 13 Jan 2024 16:20 )    Color: Yellow / Appearance: Clear / SG: >1.030 / pH: x  Gluc: 532 mg/dL / Ketone: Negative mg/dL  / Bili: Negative / Urobili: 0.2 mg/dL   Blood: x / Protein: 100 mg/dL / Nitrite: Negative   Leuk Esterase: Negative / RBC: 3 /HPF / WBC 5 /HPF   Sq Epi: x / Non Sq Epi: x / Bacteria: x    CT brain stroke protocol 1/13/24  IMPRESSION:  Mild volume loss, microvascular disease, no midline shift. Tiny focus of hyperattenuation in the left caudate head nucleus. Suspected cavernoma. Cannot exclude tiny hemorrhage. MRI with gadolinium may be helpful for further evaluation, if clinically indicated. Encephalomalacia and gliosis in the right occipital lobe.    Discussed with Dr. Kee in the ED at 3:57 PM.    CT brain without contrast 1/13/24  IMPRESSION:  Small hyperdense focus in the left caudate nucleus head remains stable. It may be further assessed and characterized with contrast-enhanced MRI imaging, if clinically indicated.

## 2024-01-13 NOTE — ED ADULT NURSE NOTE - NSFALLHARMRISKINTERV_ED_ALL_ED
Assistance OOB with selected safe patient handling equipment if applicable/Assistance with ambulation/Communicate risk of Fall with Harm to all staff, patient, and family/Monitor gait and stability/Monitor for mental status changes and reorient to person, place, and time, as needed/Provide visual cue: red socks, yellow wristband, yellow gown, etc/Reinforce activity limits and safety measures with patient and family/Toileting schedule using arm’s reach rule for commode and bathroom/Use of alarms - bed, stretcher, chair and/or video monitoring/Bed in lowest position, wheels locked, appropriate side rails in place/Call bell, personal items and telephone in reach/Instruct patient to call for assistance before getting out of bed/chair/stretcher/Non-slip footwear applied when patient is off stretcher/[place] to call system/Physically safe environment - no spills, clutter or unnecessary equipment/Purposeful Proactive Rounding/Room/bathroom lighting operational, light cord in reach Assistance OOB with selected safe patient handling equipment if applicable/Assistance with ambulation/Communicate risk of Fall with Harm to all staff, patient, and family/Monitor gait and stability/Monitor for mental status changes and reorient to person, place, and time, as needed/Provide visual cue: red socks, yellow wristband, yellow gown, etc/Reinforce activity limits and safety measures with patient and family/Toileting schedule using arm’s reach rule for commode and bathroom/Use of alarms - bed, stretcher, chair and/or video monitoring/Bed in lowest position, wheels locked, appropriate side rails in place/Call bell, personal items and telephone in reach/Instruct patient to call for assistance before getting out of bed/chair/stretcher/Non-slip footwear applied when patient is off stretcher/[place] to call system/Physically safe environment - no spills, clutter or unnecessary equipment/Purposeful Proactive Rounding/Room/bathroom lighting operational, light cord in reach

## 2024-01-13 NOTE — ED PROVIDER NOTE - OBJECTIVE STATEMENT
58F pmhx CVA/ TIA, DM, HTN, brought in by ambulance for reported confusion/ AMS at [facility] and complaint of possible RUE weakness per witnesses, per EMS they found pt to be hyperglycemic and they did notice Left arm drift and Left hand weakness. Patient on arrival in triage with no complaints except for 'elevated sugar'- patient reports that she has not been able to get insulin from either PMD or endocrinologist and FS have been 500 for a 'long time'. She states she was seen at a hospital 1 week ago for similar complaint but that they 'would not give me insulin', and that they also obtained CT scan of her head. Denies fevers, chest pain, sob, numbness. notes chronic low back pain on right side. Notes moderate headache and thirst. 58F pmhx CVA/ TIA, DM, HTN, brought in by ambulance for reported confusion/ AMS at [facility] and complaint of possible RUE weakness per witnesses, per EMS they found pt to be hyperglycemic and they did notice Left arm drift and Left hand weakness. Patient on arrival in triage with no complaints except for 'elevated sugar'- patient reports that she has not been able to get insulin from either PMD or endocrinologist and FS have been 500 for a 'long time'. She states she was seen at a hospital 1 week ago for similar complaint but that they 'would not give me insulin', and that they also obtained CT scan of her head. Denies fevers, chest pain, sob, numbness. notes chronic low back pain on right side. Notes moderate headache and thirst.

## 2024-01-13 NOTE — ED ADULT NURSE NOTE - SUICIDE SCREENING DEPRESSION
Chief Complaint   Patient presents with     Numbness     [facility] NEW CONSULTATION BILATERAL LEG WEAKNESS, RIGHT LEG NUMBNESS     Extremity Weakness       Kiran Crawford, EMT   Negative

## 2024-01-13 NOTE — ED PROVIDER NOTE - CLINICAL SUMMARY MEDICAL DECISION MAKING FREE TEXT BOX
58F pmhx CVA/ TIA, DM, HTN, brought in by ambulance for reported confusion/ AMS at [facility] and complaint of possible RUE weakness per witnesses, per EMS they found pt to be hyperglycemic and they did notice Left arm drift and Left hand weakness. Patient on arrival in triage with no complaints except for 'elevated sugar'- patient reports that she has not been able to get insulin from either PMD or endocrinologist and FS have been 500 for a 'long time'. She states she was seen at a hospital 1 week ago for similar complaint but that they 'would not give me insulin', and that they also obtained CT scan of her head. Denies fevers, chest pain, sob, numbness. notes chronic low back pain on right side. Notes moderate headache and thirst.   - code stroke called on initial arrival due to EMS report of left arm drift/ weakness - on assessment nihss 0 with no weakness, serial neuro checks nonfocal, cta cancelled, hyperglycemia likely cause of symptoms, tx initiated with insulin/ fluids, rule out underlying infcn, mild trop elevation suspected demand but no chest pain,     - ct with small tiny area of question hemorrhage - d/w neurosx DR Liza Gary, recommended ct repeat 6 hours and if stable then no indication for txfr  but can consider prophylactic keppra 500mg bid x 7 days for seizure prevention 58F pmhx CVA/ TIA, DM, HTN, brought in by ambulance for reported confusion/ AMS at [facility] and complaint of possible RUE weakness per witnesses, per EMS they found pt to be hyperglycemic and they did notice Left arm drift and Left hand weakness. Patient on arrival in triage with no complaints except for 'elevated sugar'- patient reports that she has not been able to get insulin from either PMD or endocrinologist and FS have been 500 for a 'long time'. She states she was seen at a hospital 1 week ago for similar complaint but that they 'would not give me insulin', and that they also obtained CT scan of her head. Denies fevers, chest pain, sob, numbness. notes chronic low back pain on right side. Notes moderate headache and thirst.   - code stroke called on initial arrival due to EMS report of left arm drift/ weakness - on assessment nihss 0 with no weakness, serial neuro checks nonfocal, cta cancelled, hyperglycemia likely cause of symptoms, tx initiated with insulin/ fluids, rule out underlying infcn, mild trop elevation suspected demand but no chest pain,     - ct with small tiny area of question hemorrhage - d/w neurosx DR Liza Gary, recommended ct repeat 6 hours and if stable then no indication for txfr  but can consider prophylactic keppra 500mg bid x 7 days for seizure prevention

## 2024-01-13 NOTE — ED ADULT TRIAGE NOTE - CHIEF COMPLAINT QUOTE
as per ems, while at [employer], pt c/o right arm weakness, bystanders noticed pt confused 1hr ago. left arm weakness and disorientation noticed on scene. left arm weakness resolved in triage. as per ems, while at [employer], pt c/o right arm weakness, bystanders noticed pt confused 1hr ago. left arm weakness and disorientation noticed on scene. left arm weakness resolved in triage.

## 2024-01-13 NOTE — ED PROVIDER NOTE - PHYSICAL EXAMINATION
Gen: aox3, nad   Head: NCAT  ENT: Airway patent, dry mucous membranes, nasal passageways clear, no pharyngeal erythema or exudates, uvula midline, no cervical lymphadenopathy  Cardiac: Normal rate, normal rhythm,   Respiratory: Lungs CTA B/L  Gastrointestinal: Abdomen soft, nontender, nondistended, no rebound, no guarding  MSK: No gross abnormalities, FROM of all four extremities, no edema  HEME: Extremities warm, pulses intact and symmetrical in all four extremities  Skin: No rashes, no lesions  Neuro: No gross neurologic deficits, CN II-XII intact, no facial asymmetry, no dysarthria, no dysmetria, no drift, strength equal in all four extremities, no sensory deficits

## 2024-01-13 NOTE — ED ADULT NURSE NOTE - OBJECTIVE STATEMENT
patient A+Ox4, accompanied with friend, presents to ER complaining of R sided weakness and numbness and tingling to both hands and disorientation. As per patient, patient stated she was at [employer] when she felt "disoriented and a warm flash with weakness and numbness." patient wasn't able to hold phone. Patient stated symptoms started last week when she ran out of insulin and hasn't been able to get more. NIH=0. Code Stroke called patient A+Ox4, accompanied with friend, presents to ER complaining of R sided weakness and numbness and tingling to both hands and disorientation. As per patient, patient stated she was at [employer] when she felt "disoriented and a warm flash with weakness and numbness." patient wasn't able to hold phone. Patient stated symptoms started last week when she ran out of insulin and hasn't been able to get more. NIH=0. Code Stroke called patient A+Ox4, accompanied with friend, presents to ER complaining of R sided weakness and numbness and tingling to both hands and disorientation. As per patient, patient stated she was at [employer] when she felt "disoriented and a warm flash with weakness and numbness." patient wasn't able to hold phone. all symptoms resolved, denies blurry vision. Patient stated symptoms started last week when she ran out of insulin and hasn't been able to get more. NIH=0. Code Stroke called, escorted to CT via stretcher, USG IV placed by SARBJIT roberson patient A+Ox4, accompanied with friend, presents to ER complaining of R sided weakness and numbness and tingling to both hands and disorientation. As per patient, patient stated she was at [employer] when she felt "disoriented and a warm flash with weakness and numbness." patient wasn't able to hold phone. all symptoms resolved, denies blurry vision. Patient stated symptoms started last week when she ran out of insulin and hasn't been able to get more. NIH=0. Code Stroke called, escorted to CT via stretcher, USG IV placed by SARBJIT roberson

## 2024-01-13 NOTE — ED ADULT NURSE NOTE - EXTENSIONS OF SELF_ADULT
Assumed care at [address] [place] [address]. Patient A&Ox4. Neuros intact. Cont to c/o headache. Some relief with PRN tylenol #3. PRN xanax for anxiety. Dissolvable nasal packing in place, no drainage. Self irrigating as needed. Will cont to monitor. None

## 2024-01-13 NOTE — H&P ADULT - NSICDXPASTMEDICALHX_GEN_ALL_CORE_FT
PAST MEDICAL HISTORY:  History of TIA (transient ischemic attack)     HLD (hyperlipidemia)     HTN (hypertension)     Insulin dependent type 2 diabetes mellitus

## 2024-01-13 NOTE — H&P ADULT - HISTORY OF PRESENT ILLNESS
Tiffanie Burrell is a 58 year old female with PMHx of      In the ED, VSS except HR as elevated as 95 and BP as elevated as 192/128. Labs grossly unremarkable except blood glucose 532 and troponin 91.7. U/A with proteinuria and glucosuria. [facility] stroke protocol with tiny focus of hyperattenuation in the left caudate head nucleus. Suspected cavernoma. Cannot exclude tiny hemorrhage. ER physician, Dr. Kee spoke with neurosurgery, Dr. Liza Gary who recommended repeat CT head in 6 hours and if stable, no indication for transfer but to start prophylactic keppra 500 mg x 7 days for seizure prevention. Repeat CT head with small hyperdense focus in the left caudate nucleus head remains stable. Received 1L NS bolus, insulin 5 U IV, acetaminophen 1 g IV, morphine 4 mg IV, and metoclopramide 10 mg IV. Tiffanie Burrell is a 58 year old female with PMHx of      In the ED, VSS except HR as elevated as 95 and BP as elevated as 192/128. Labs grossly unremarkable except blood glucose 532 and troponin 91.7. U/A with proteinuria and glucosuria. [facility] stroke protocol with tiny focus of hyperattenuation in the left caudate head nucleus. Suspected cavernoma. Cannot exclude tiny hemorrhage. ER physician, Dr. Kee spoke with neurosurgery, Dr. Liza Gary who recommended repeat CT head in 6 hours and if stable, no indication for transfer but to start prophylactic keppra 500 mg x 7 days for seizure prevention. Repeat CT head with small hyperdense focus in the left caudate nucleus head remains stable. Received 1L NS bolus, insulin 5 U IV, acetaminophen 1 g IV, morphine 4 mg IV, and metoclopramide 10 mg IV. Tiffanie Burrell is a 58 year old female with PMHx of HTN, HLD, IDDM2, and hx of TIA who presented to the ED on 1/13/24 for complaints of right upper extremity weakness.      In the ED, VSS except HR as elevated as 95 and BP as elevated as 192/128. Labs grossly unremarkable except blood glucose 532 and troponin 91.7. U/A with proteinuria and glucosuria. [facility] stroke protocol with tiny focus of hyperattenuation in the left caudate head nucleus. Suspected cavernoma. Cannot exclude tiny hemorrhage. ER physician, Dr. Kee spoke with neurosurgery, Dr. Liza Gary who recommended repeat CT head in 6 hours and if stable, no indication for transfer but to start prophylactic keppra 500 mg x 7 days for seizure prevention. Repeat CT head with small hyperdense focus in the left caudate nucleus head remains stable. Received 1L NS bolus, insulin 5 U IV, acetaminophen 1 g IV, morphine 4 mg IV, and metoclopramide 10 mg IV. Tiffanie Burrell is a 58 year old female with PMHx of HTN, HLD, IDDM2, and hx of TIA who presented to the ED on 1/13/24 for complaints of right upper extremity weakness.      In the ED, VSS except HR as elevated as 95 and BP as elevated as 192/128. Labs grossly unremarkable except blood glucose 532 and troponin 91.7. U/A with proteinuria and glucosuria. [facility] stroke protocol with tiny focus of hyperattenuation in the left caudate head nucleus. Suspected cavernoma. Cannot exclude tiny hemorrhage. ER physician, Dr. Kee spoke with neurosurgery, Dr. Liza Gary who recommended repeat CT head in 6 hours and if stable, no indication for transfer but to start prophylactic keppra 500 mg x 7 days for seizure prevention. Repeat CT head with small hyperdense focus in the left caudate nucleus head remains stable. Received 1L NS bolus, insulin 5 U IV, acetaminophen 1 g IV, morphine 4 mg IV, and metoclopramide 10 mg IV. Tiffanie Burrell is a 58 year old female with PMHx of HTN, HLD, IDDM2, and hx of TIA who presented to the ED on 1/13/24 for complaints of right upper extremity weakness.      In the ED, VSS except HR as elevated as 95 and BP as elevated as 192/128. Labs grossly unremarkable except blood glucose 532 and troponin 91.7. U/A with proteinuria and glucosuria. Initially was called as a code stroke given EMS report but given NIHSS 0, ER physician cancelled CTA H/N. [facility] stroke protocol with tiny focus of hyperattenuation in the left caudate head nucleus. Suspected cavernoma. Cannot exclude tiny hemorrhage. ER physician, Dr. Kee spoke with neurosurgery, Dr. Liza Gary who recommended repeat CT head in 6 hours and if stable, no indication for transfer but to start prophylactic keppra 500 mg x 7 days for seizure prevention. Repeat CT head with small hyperdense focus in the left caudate nucleus head remains stable. Received 1L NS bolus, insulin 5 U IV, acetaminophen 1 g IV, morphine 4 mg IV, and metoclopramide 10 mg IV. Tiffanie Burrell is a 58 year old female with PMHx of HTN, HLD, IDDM2, and hx of TIA who presented to the ED on 1/13/24 for complaints of right upper extremity weakness.      In the ED, VSS except HR as elevated as 95 and BP as elevated as 192/128. Labs grossly unremarkable except blood glucose 532 and troponin 91.7. U/A with proteinuria and glucosuria. Initially was called as a code stroke given EMS report but given NIHSS 0, ER physician cancelled CTA H/N. [facility] stroke protocol with tiny focus of hyperattenuation in the left caudate head nucleus. Suspected cavernoma. Cannot exclude tiny hemorrhage. ER physician, Dr. Kee spoke with neurosurgery, Dr. Liza Gary who recommended repeat CT head in 6 hours and if stable, no indication for transfer but to start prophylactic keppra 500 mg x 7 days for seizure prevention. Repeat CT head with small hyperdense focus in the left caudate nucleus head remains stable. Received 1L NS bolus, insulin 5 U IV, acetaminophen 1 g IV, morphine 4 mg IV, and metoclopramide 10 mg IV. Tiffanie Burrell is a 58 year old female with PMHx of HTN, HLD, IDDM2, and hx of TIA who presented to the ED on 1/13/24 for complaints of right upper extremity weakness.    History is very limited as patient is a poor historian. Patient reports she was not feeling well this morning while at [employer] around 10-11 AM. [place] disoriented and started having RUE weakness. Her friend at [employer] called EMS. 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 Given a bolus of fluids but not admitted. States she ran out of insulin 4 days ago and there were no refills for her insulin at her pharmacy.     In the ED, VSS except HR as elevated as 95 and BP as elevated as 192/128. Labs grossly unremarkable except blood glucose 532 and troponin 91.7. U/A with proteinuria and glucosuria. Initially was called as a code stroke given EMS report but given NIHSS 0, ER physician cancelled CTA H/N. [facility] stroke protocol with tiny focus of hyperattenuation in the left caudate head nucleus. Suspected cavernoma. Cannot exclude tiny hemorrhage. ER physician, Dr. Kee spoke with neurosurgery, Dr. Liza Gary who recommended repeat CT head in 6 hours and if stable, no indication for transfer but to start prophylactic keppra 500 mg x 7 days for seizure prevention. Repeat CT head with small hyperdense focus in the left caudate nucleus head remains stable. Received 1L NS bolus, insulin 5 U IV, acetaminophen 1 g IV, morphine 4 mg IV, and metoclopramide 10 mg IV. Back to baseline at the time of my examination. Tiffanie Burrell is a 58 year old female with PMHx of HTN, HLD, IDDM2, and hx of TIA who presented to the ED on 1/13/24 for complaints of right upper extremity weakness.    History is very limited as patient is a poor historian. Patient reports she was not feeling well this morning while at [employer] around 10-11 AM. [place] disoriented and started having RUE weakness. Her friend at [employer] called EMS. 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 Given a bolus of fluids but not admitted. States she ran out of insulin 4 days ago and there were no refills for her insulin at her pharmacy.     In the ED, VSS except HR as elevated as 95 and BP as elevated as 192/128. Labs grossly unremarkable except blood glucose 532 and troponin 91.7. U/A with proteinuria and glucosuria. Initially was called as a code stroke given EMS report but given NIHSS 0, ER physician cancelled CTA H/N. [facility] stroke protocol with tiny focus of hyperattenuation in the left caudate head nucleus. Suspected cavernoma. Cannot exclude tiny hemorrhage. ER physician, Dr. Kee spoke with neurosurgery, Dr. Liza Gary who recommended repeat CT head in 6 hours and if stable, no indication for transfer but to start prophylactic keppra 500 mg x 7 days for seizure prevention. Repeat CT head with small hyperdense focus in the left caudate nucleus head remains stable. Received 1L NS bolus, insulin 5 U IV, acetaminophen 1 g IV, morphine 4 mg IV, and metoclopramide 10 mg IV. Back to baseline at the time of my examination.

## 2024-01-13 NOTE — H&P ADULT - ASSESSMENT
Suspected CVA  ****  NIHSS 0 as per ER physician documentation  [facility] stroke protocol with tiny focus of hyperattenuation in the left caudate head nucleus, suspected cavernoma, cannot exclude tiny hemorrhage  Repeat CT head (6 hours later) with stable small hyperdense focus in the left caudate nucleus head  Empirically started on levetiracetam 500 mg BID x 7 days as per neurosurgery  Allowing for permissive HTN up to 220/120 for first 24-48 hours  F/u TSH, lipid panel, A1c for risk stratification  F/u echo with bubble, MRI brain with contrast  Neurosurgery recommendations appreciated, no indication for transfer given CT head is stable  Consult neurology - please call in AM  Telemetry    ***DM with hyperglycemia  Blood glucose 532 on admission, bicarb and anion gap WNL  U/A with glucosuria  S/p 1L NS bolus and insulin 5 U IV in the ED    Demand ischemia secondary to above  Troponin 91.7 on admission  F/u serial trops, echocardiogram  Telemetry       Suspected CVA  ****  NIHSS 0 as per ER physician documentation  [facility] stroke protocol with tiny focus of hyperattenuation in the left caudate head nucleus, suspected cavernoma, cannot exclude tiny hemorrhage  Repeat CT head (6 hours later) with stable small hyperdense focus in the left caudate nucleus head  Empirically started on levetiracetam 500 mg BID x 7 days as per neurosurgery  Allowing for permissive HTN up to 220/120 for first 24-48 hours  F/u TSH, lipid panel, A1c for risk stratification  F/u echo with bubble, MRI brain with contrast  Neurosurgery recommendations appreciated, no indication for transfer given CT head is stable  Consult neurology - please call in AM  Telemetry    ***DM with hyperglycemia  Blood glucose 532 on admission, bicarb and anion gap WNL  U/A with glucosuria  S/p 1L NS bolus and insulin 5 U IV in the ED    Demand ischemia secondary to above  Troponin 91.7 on admission  F/u serial trops, echocardiogram  Telemetry     Tiffanie Burrell is a 58 year old female with PMHx of HTN, HLD, IDDM2, and hx of TIA who presented to the ED on 1/13/24 for complaints of right upper extremity weakness and admitted for suspected CVA.    Suspected CVA  ****  NIHSS 0 as per ER physician documentation  [facility] with tiny focus of hyperattenuation in the left caudate head nucleus, suspected cavernoma, cannot exclude tiny hemorrhage  Repeat CT head (6 hours later) with stable small hyperdense focus in the left caudate nucleus head  Empirically started on levetiracetam 500 mg BID x 7 days as per neurosurgery  Neuro checks q4, PTA atorvastatin 80 mg, will defer initiation of ASA 81 mg to neurology given ?tiny hemorrhage on initial CT head  Allowing for permissive HTN up to 220/120 for first 24-48 hours  F/u TSH, lipid panel, A1c for risk stratification  F/u echo with bubble, MRI brain with contrast  Neurosurgery recommendations appreciated, no indication for transfer given CT head is stable  Consult neurology - please call in AM  Telemetry    IDDM2 with hyperglycemia  Blood glucose 532 on admission, bicarb and anion gap WNL, U/A with glucosuria  S/p 1L NS bolus and insulin 5 U IV in the ED  POC qac and qhs, PTA Lantus 52 U qhs, moderate dose SSI qac and qhs started, blood glucose goal < 180  F/u A1c    Demand ischemia secondary to above  Troponin 91.7 on admission  F/u serial trops, echocardiogram  Telemetry      Chronic medical conditions:   HTN: PTA clonidine 0.3 mg patch and labetalol 300 mg held due to allowing for permissive HTN  HLD: PTA atorvastatin 80 mg   Tiffanie Burrell is a 58 year old female with PMHx of HTN, HLD, IDDM2, and hx of TIA who presented to the ED on 1/13/24 for complaints of right upper extremity weakness and admitted for suspected CVA.    Suspected CVA  ****  NIHSS 0 as per ER physician documentation  [facility] with tiny focus of hyperattenuation in the left caudate head nucleus, suspected cavernoma, cannot exclude tiny hemorrhage  Repeat CT head (6 hours later) with stable small hyperdense focus in the left caudate nucleus head  Empirically started on levetiracetam 500 mg BID x 7 days as per neurosurgery  Neuro checks q4, PTA atorvastatin 80 mg, will defer initiation of ASA 81 mg to neurology given ?tiny hemorrhage on initial CT head  Allowing for permissive HTN up to 220/120 for first 24-48 hours  F/u TSH, lipid panel, A1c for risk stratification  F/u echo with bubble, MRI brain with contrast  Neurosurgery recommendations appreciated, no indication for transfer given CT head is stable  Consult neurology - please call in AM  Telemetry    IDDM2 with hyperglycemia  Blood glucose 532 on admission, bicarb and anion gap WNL, U/A with glucosuria  S/p 1L NS bolus and insulin 5 U IV in the ED  POC qac and qhs, PTA Lantus 52 U qhs, moderate dose SSI qac and qhs started, blood glucose goal < 180  F/u A1c    Demand ischemia secondary to above  Troponin 91.7 on admission  F/u serial trops, echocardiogram  Telemetry      Chronic medical conditions:   HTN: PTA clonidine 0.3 mg patch and labetalol 300 mg held due to allowing for permissive HTN  HLD: PTA atorvastatin 80 mg   Tiffanie Burrell is a 58 year old female with PMHx of HTN, HLD, IDDM2, and hx of TIA who presented to the ED on 1/13/24 for complaints of right upper extremity weakness and admitted for suspected CVA.    Suspected CVA  Complaints of not feeling well while at [employer] around 10-11 AM today, felt disoriented and with RUE weakness afterwards  NIHSS 0 as per ER physician documentation  Physical exam without focal neurological deficits  [facility] with tiny focus of hyperattenuation in the left caudate head nucleus, suspected cavernoma, cannot exclude tiny hemorrhage  Repeat CT head (6 hours later) with stable small hyperdense focus in the left caudate nucleus head  Empirically started on levetiracetam 500 mg BID x 7 days as per neurosurgery  Neuro checks q4, PTA atorvastatin 80 mg, will defer initiation of ASA 81 mg to neurology given ?tiny hemorrhage on initial CT head  Allowing for permissive HTN up to 220/120 for first 24-48 hours  F/u TSH, lipid panel, A1c for risk stratification  F/u echo with bubble, MRI brain with contrast  Neurosurgery recommendations appreciated, no indication for transfer given CT head is stable  Consult neurology - please call in AM  Telemetry    IDDM2 with hyperglycemia  Reports she ran out of insulin four days ago  Blood glucose 532 on admission, bicarb and anion gap WNL, U/A with glucosuria  S/p 1L NS bolus and insulin 5 U IV in the ED  POC qac and qhs, PTA Lantus 52 U qhs, moderate dose SSI qac and qhs started, blood glucose goal < 180  F/u A1c    Demand ischemia secondary to above  Troponin 91.7 on admission  F/u serial trops, echocardiogram  Telemetry      Chronic medical conditions:   HTN: PTA clonidine 0.3 mg patch and labetalol 200 mg BID held due to allowing for permissive HTN  HLD: PTA atorvastatin 80 mg    Medication reconciliation completed using recent prescription fill history from [employer] as patient does not know her medications except for labetalol 200 mg BID. Please call her pharmacy in AM to confirm med list.   Tiffanie Burrell is a 58 year old female with PMHx of HTN, HLD, IDDM2, and hx of TIA who presented to the ED on 1/13/24 for complaints of right upper extremity weakness and admitted for suspected CVA.    Suspected CVA  Complaints of not feeling well while at [employer] around 10-11 AM today, felt disoriented and with RUE weakness afterwards  NIHSS 0 as per ER physician documentation  Physical exam without focal neurological deficits  [facility] with tiny focus of hyperattenuation in the left caudate head nucleus, suspected cavernoma, cannot exclude tiny hemorrhage  Repeat CT head (6 hours later) with stable small hyperdense focus in the left caudate nucleus head  Empirically started on levetiracetam 500 mg BID x 7 days as per neurosurgery  Neuro checks q4, PTA atorvastatin 80 mg, will defer initiation of ASA 81 mg to neurology given ?tiny hemorrhage on initial CT head  Allowing for permissive HTN up to 220/120 for first 24-48 hours  F/u TSH, lipid panel, A1c for risk stratification  F/u echo with bubble, MRI brain with contrast  Neurosurgery recommendations appreciated, no indication for transfer given CT head is stable  Consult neurology - please call in AM  Telemetry    IDDM2 with hyperglycemia  Reports she ran out of insulin four days ago  Blood glucose 532 on admission, bicarb and anion gap WNL, U/A with glucosuria  S/p 1L NS bolus and insulin 5 U IV in the ED  POC qac and qhs, PTA Lantus 52 U qhs, moderate dose SSI qac and qhs started, blood glucose goal < 180  F/u A1c    Demand ischemia secondary to above  Troponin 91.7 on admission  F/u serial trops, echocardiogram  Telemetry      Chronic medical conditions:   HTN: PTA clonidine 0.3 mg patch and labetalol 200 mg BID held due to allowing for permissive HTN  HLD: PTA atorvastatin 80 mg    Medication reconciliation completed using recent prescription fill history from [employer] as patient does not know her medications except for labetalol 200 mg BID. Please call her pharmacy in AM to confirm med list.   Tiffanie Burrell is a 58 year old female with PMHx of HTN, HLD, IDDM2, and hx of TIA who presented to the ED on 1/13/24 for complaints of right upper extremity weakness and admitted for suspected CVA.    Suspected CVA  Complaints of not feeling well while at [employer] around 10-11 AM today, felt disoriented and with RUE weakness afterwards  NIHSS 0 as per ER physician documentation  Physical exam without focal neurological deficits  [facility] stroke protocol with tiny focus of hyperattenuation in the left caudate head nucleus, suspected cavernoma, cannot exclude tiny hemorrhage  Repeat CT head (6 hours later) with stable small hyperdense focus in the left caudate nucleus head  Empirically started on levetiracetam 500 mg BID x 7 days for seizure prophylaxis as per neurosurgery  Neuro checks q4, PTA atorvastatin 80 mg, will defer initiation of ASA 81 mg to neurology given ?tiny hemorrhage on initial CT head  Allowing for permissive HTN up to 220/120 for first 24-48 hours  F/u TSH, lipid panel, A1c for risk stratification  F/u echo with bubble, MRI brain with contrast  PT/OT consulted  Consult neurology - please call in AM  Neurosurgery recommendations appreciated, no indication for transfer given CT head is stable  Telemetry    IDDM2 with hyperglycemia  Reports she ran out of insulin four days ago  Blood glucose 532 on admission, bicarb and anion gap WNL, U/A with glucosuria  S/p 1L NS bolus and insulin 5 U IV in the ED  POC qac and qhs, PTA Lantus 52 U qhs, moderate dose SSI qac and qhs started, blood glucose goal < 180  F/u A1c    Demand ischemia secondary to above  Troponin 91.7 on admission  F/u serial trops, echocardiogram  Telemetry      Chronic medical conditions:   HTN: PTA clonidine 0.3 mg patch and labetalol 200 mg BID held due to allowing for permissive HTN  HLD: PTA atorvastatin 80 mg    Medication reconciliation completed using recent prescription fill history from [employer] as patient does not know her medications except for labetalol 200 mg BID. Please call her pharmacy in AM to confirm med list.   Tiffanie Burrell is a 58 year old female with PMHx of HTN, HLD, IDDM2, and hx of TIA who presented to the ED on 1/13/24 for complaints of right upper extremity weakness and admitted for suspected CVA.    Suspected CVA  Complaints of not feeling well while at [employer] around 10-11 AM today, felt disoriented and with RUE weakness afterwards  NIHSS 0 as per ER physician documentation  Physical exam without focal neurological deficits  [facility] stroke protocol with tiny focus of hyperattenuation in the left caudate head nucleus, suspected cavernoma, cannot exclude tiny hemorrhage  Repeat CT head (6 hours later) with stable small hyperdense focus in the left caudate nucleus head  Empirically started on levetiracetam 500 mg BID x 7 days for seizure prophylaxis as per neurosurgery  Neuro checks q4, PTA atorvastatin 80 mg, will defer initiation of ASA 81 mg to neurology given ?tiny hemorrhage on initial CT head  Allowing for permissive HTN up to 220/120 for first 24-48 hours  F/u TSH, lipid panel, A1c for risk stratification  F/u echo with bubble, MRI brain with contrast  PT/OT consulted  Consult neurology - please call in AM  Neurosurgery recommendations appreciated, no indication for transfer given CT head is stable  Telemetry    IDDM2 with hyperglycemia  Reports she ran out of insulin four days ago  Blood glucose 532 on admission, bicarb and anion gap WNL, U/A with glucosuria  S/p 1L NS bolus and insulin 5 U IV in the ED  POC qac and qhs, PTA Lantus 52 U qhs, moderate dose SSI qac and qhs started, blood glucose goal < 180  F/u A1c    Demand ischemia secondary to above  Troponin 91.7 on admission  F/u serial trops, echocardiogram  Telemetry      Chronic medical conditions:   HTN: PTA clonidine 0.3 mg patch and labetalol 200 mg BID held due to allowing for permissive HTN  HLD: PTA atorvastatin 80 mg    Medication reconciliation completed using recent prescription fill history from [employer] as patient does not know her medications except for labetalol 200 mg BID. Please call her pharmacy in AM to confirm med list.

## 2024-01-13 NOTE — ED ADULT NURSE NOTE - ED STAT RN HANDOFF DETAILS
Report to ANGELICA Cruz pt aox3 skin intact iv site patent pt denies pain vss, pt aware and involved in plan of care for admission

## 2024-01-13 NOTE — ED PROVIDER NOTE - PROGRESS NOTE DETAILS
signed out pending repeat ct head with nonfocal exam with rec for admission/ obs for TIA eval MD Justyn: Pt signed out to me by Dr. Kee to f/u repeat CTH which is stable. Sign out to admit for inpt neuro eval and mri. FSG wnl. Pt remains asymptomatic axoxo3, without focal weakness.

## 2024-01-13 NOTE — ED ADULT NURSE NOTE - CHIEF COMPLAINT QUOTE
as per ems, while at [employer], pt c/o right arm weakness, bystanders noticed pt confused 1hr ago. left arm weakness and disorientation noticed on scene. left arm weakness resolved in triage. as per ems, while at [employer], pt c/o right arm weakness, bystanders noticed pt confused 1hr ago. left arm weakness and disorientation noticed on scene. left arm weakness resolved in triage.

## 2024-01-13 NOTE — H&P ADULT - NSHPPHYSICALEXAM_GEN_ALL_CORE
T(C): 36.6 (01-14-24 @ 00:02), Max: 36.8 (01-13-24 @ 17:33)  HR: 92 (01-14-24 @ 00:02) (92 - 96)  BP: 168/85 (01-14-24 @ 00:02) (168/85 - 201/114)  RR: 18 (01-14-24 @ 00:02) (16 - 18)  SpO2: 100% (01-14-24 @ 00:02) (94% - 100%)    CONSTITUTIONAL: Well groomed, no apparent distress, obese  EYES: PERRLA and symmetric, EOMI  ENMT: Oral mucosa with moist membranes  RESP: No respiratory distress, no use of accessory muscles  CV: RRR  GI: Soft, NT, ND  NEURO: CN II-XII intact; b/l hand  strength WNL, b/l UE and LE 5/5 muscle strength, finger to nose WNL, sensation intact in upper and lower extremities b/l to light touch

## 2024-01-14 DIAGNOSIS — E11.65 TYPE 2 DIABETES MELLITUS WITH HYPERGLYCEMIA: ICD-10-CM

## 2024-01-14 DIAGNOSIS — E78.5 HYPERLIPIDEMIA, UNSPECIFIED: ICD-10-CM

## 2024-01-14 DIAGNOSIS — I10 ESSENTIAL (PRIMARY) HYPERTENSION: ICD-10-CM

## 2024-01-14 DIAGNOSIS — I24.89 OTHER FORMS OF ACUTE ISCHEMIC HEART DISEASE: ICD-10-CM

## 2024-01-14 DIAGNOSIS — R09.89 OTHER SPECIFIED SYMPTOMS AND SIGNS INVOLVING THE CIRCULATORY AND RESPIRATORY SYSTEMS: ICD-10-CM

## 2024-01-14 LAB
A1C WITH ESTIMATED AVERAGE GLUCOSE RESULT: 13.4 % — HIGH (ref 4–5.6)
A1C WITH ESTIMATED AVERAGE GLUCOSE RESULT: 13.4 % — HIGH (ref 4–5.6)
CHOLEST SERPL-MCNC: 233 MG/DL — HIGH
CHOLEST SERPL-MCNC: 233 MG/DL — HIGH
ESTIMATED AVERAGE GLUCOSE: 338 MG/DL — HIGH (ref 68–114)
ESTIMATED AVERAGE GLUCOSE: 338 MG/DL — HIGH (ref 68–114)
GLUCOSE BLDC GLUCOMTR-MCNC: 192 MG/DL — HIGH (ref 70–99)
GLUCOSE BLDC GLUCOMTR-MCNC: 192 MG/DL — HIGH (ref 70–99)
GLUCOSE BLDC GLUCOMTR-MCNC: 305 MG/DL — HIGH (ref 70–99)
GLUCOSE BLDC GLUCOMTR-MCNC: 305 MG/DL — HIGH (ref 70–99)
GLUCOSE BLDC GLUCOMTR-MCNC: 309 MG/DL — HIGH (ref 70–99)
GLUCOSE BLDC GLUCOMTR-MCNC: 309 MG/DL — HIGH (ref 70–99)
GLUCOSE BLDC GLUCOMTR-MCNC: 338 MG/DL — HIGH (ref 70–99)
GLUCOSE BLDC GLUCOMTR-MCNC: 338 MG/DL — HIGH (ref 70–99)
GLUCOSE BLDC GLUCOMTR-MCNC: 363 MG/DL — HIGH (ref 70–99)
GLUCOSE BLDC GLUCOMTR-MCNC: 363 MG/DL — HIGH (ref 70–99)
HCV AB S/CO SERPL IA: 0.1 S/CO — SIGNIFICANT CHANGE UP (ref 0–0.99)
HCV AB S/CO SERPL IA: 0.1 S/CO — SIGNIFICANT CHANGE UP (ref 0–0.99)
HCV AB SERPL-IMP: SIGNIFICANT CHANGE UP
HCV AB SERPL-IMP: SIGNIFICANT CHANGE UP
HDLC SERPL-MCNC: 45 MG/DL — LOW
HDLC SERPL-MCNC: 45 MG/DL — LOW
LIPID PNL WITH DIRECT LDL SERPL: 168 MG/DL — HIGH
LIPID PNL WITH DIRECT LDL SERPL: 168 MG/DL — HIGH
NON HDL CHOLESTEROL: 188 MG/DL — HIGH
NON HDL CHOLESTEROL: 188 MG/DL — HIGH
TRIGL SERPL-MCNC: 114 MG/DL — SIGNIFICANT CHANGE UP
TRIGL SERPL-MCNC: 114 MG/DL — SIGNIFICANT CHANGE UP
TROPONIN I, HIGH SENSITIVITY RESULT: 103.6 NG/L — HIGH
TROPONIN I, HIGH SENSITIVITY RESULT: 103.6 NG/L — HIGH
TROPONIN I, HIGH SENSITIVITY RESULT: 104.6 NG/L — HIGH
TROPONIN I, HIGH SENSITIVITY RESULT: 104.6 NG/L — HIGH
TSH SERPL-MCNC: 1.62 UU/ML — SIGNIFICANT CHANGE UP (ref 0.36–3.74)
TSH SERPL-MCNC: 1.62 UU/ML — SIGNIFICANT CHANGE UP (ref 0.36–3.74)

## 2024-01-14 PROCEDURE — 99232 SBSQ HOSP IP/OBS MODERATE 35: CPT

## 2024-01-14 RX ORDER — DEXTROSE 50 % IN WATER 50 %
12.5 SYRINGE (ML) INTRAVENOUS ONCE
Refills: 0 | Status: DISCONTINUED | OUTPATIENT
Start: 2024-01-14 | End: 2024-01-19

## 2024-01-14 RX ORDER — DEXTROSE 50 % IN WATER 50 %
25 SYRINGE (ML) INTRAVENOUS ONCE
Refills: 0 | Status: DISCONTINUED | OUTPATIENT
Start: 2024-01-14 | End: 2024-01-19

## 2024-01-14 RX ORDER — INSULIN LISPRO 100/ML
10 VIAL (ML) SUBCUTANEOUS
Refills: 0 | Status: DISCONTINUED | OUTPATIENT
Start: 2024-01-14 | End: 2024-01-16

## 2024-01-14 RX ORDER — INSULIN LISPRO 100/ML
VIAL (ML) SUBCUTANEOUS AT BEDTIME
Refills: 0 | Status: DISCONTINUED | OUTPATIENT
Start: 2024-01-14 | End: 2024-01-19

## 2024-01-14 RX ORDER — LABETALOL HCL 100 MG
1 TABLET ORAL
Refills: 0 | DISCHARGE

## 2024-01-14 RX ORDER — INFLUENZA VIRUS VACCINE 15; 15; 15; 15 UG/.5ML; UG/.5ML; UG/.5ML; UG/.5ML
0.5 SUSPENSION INTRAMUSCULAR ONCE
Refills: 0 | Status: DISCONTINUED | OUTPATIENT
Start: 2024-01-14 | End: 2024-01-19

## 2024-01-14 RX ORDER — SODIUM CHLORIDE 9 MG/ML
1000 INJECTION, SOLUTION INTRAVENOUS
Refills: 0 | Status: DISCONTINUED | OUTPATIENT
Start: 2024-01-14 | End: 2024-01-19

## 2024-01-14 RX ORDER — HUMAN INSULIN 100 [IU]/ML
9 INJECTION, SUSPENSION SUBCUTANEOUS ONCE
Refills: 0 | Status: COMPLETED | OUTPATIENT
Start: 2024-01-14 | End: 2024-01-14

## 2024-01-14 RX ORDER — GABAPENTIN 400 MG/1
0 CAPSULE ORAL
Refills: 0 | DISCHARGE

## 2024-01-14 RX ORDER — INSULIN GLARGINE 100 [IU]/ML
52 INJECTION, SOLUTION SUBCUTANEOUS AT BEDTIME
Refills: 0 | Status: DISCONTINUED | OUTPATIENT
Start: 2024-01-14 | End: 2024-01-16

## 2024-01-14 RX ORDER — DEXTROSE 50 % IN WATER 50 %
15 SYRINGE (ML) INTRAVENOUS ONCE
Refills: 0 | Status: DISCONTINUED | OUTPATIENT
Start: 2024-01-14 | End: 2024-01-19

## 2024-01-14 RX ORDER — INSULIN LISPRO 100/ML
VIAL (ML) SUBCUTANEOUS
Refills: 0 | Status: DISCONTINUED | OUTPATIENT
Start: 2024-01-14 | End: 2024-01-19

## 2024-01-14 RX ORDER — ATORVASTATIN CALCIUM 80 MG/1
80 TABLET, FILM COATED ORAL AT BEDTIME
Refills: 0 | Status: DISCONTINUED | OUTPATIENT
Start: 2024-01-14 | End: 2024-01-19

## 2024-01-14 RX ORDER — GLUCAGON INJECTION, SOLUTION 0.5 MG/.1ML
1 INJECTION, SOLUTION SUBCUTANEOUS ONCE
Refills: 0 | Status: DISCONTINUED | OUTPATIENT
Start: 2024-01-14 | End: 2024-01-19

## 2024-01-14 RX ORDER — GABAPENTIN 400 MG/1
100 CAPSULE ORAL DAILY
Refills: 0 | Status: DISCONTINUED | OUTPATIENT
Start: 2024-01-14 | End: 2024-01-19

## 2024-01-14 RX ADMIN — GABAPENTIN 100 MILLIGRAM(S): 400 CAPSULE ORAL at 11:44

## 2024-01-14 RX ADMIN — Medication 8: at 08:35

## 2024-01-14 RX ADMIN — Medication 10: at 16:54

## 2024-01-14 RX ADMIN — Medication 8: at 11:44

## 2024-01-14 RX ADMIN — Medication 10 UNIT(S): at 16:55

## 2024-01-14 RX ADMIN — LEVETIRACETAM 500 MILLIGRAM(S): 250 TABLET, FILM COATED ORAL at 05:07

## 2024-01-14 RX ADMIN — LEVETIRACETAM 500 MILLIGRAM(S): 250 TABLET, FILM COATED ORAL at 00:22

## 2024-01-14 RX ADMIN — INSULIN GLARGINE 52 UNIT(S): 100 INJECTION, SOLUTION SUBCUTANEOUS at 21:14

## 2024-01-14 RX ADMIN — HUMAN INSULIN 9 UNIT(S): 100 INJECTION, SUSPENSION SUBCUTANEOUS at 14:32

## 2024-01-14 RX ADMIN — ATORVASTATIN CALCIUM 80 MILLIGRAM(S): 80 TABLET, FILM COATED ORAL at 21:12

## 2024-01-14 RX ADMIN — LEVETIRACETAM 500 MILLIGRAM(S): 250 TABLET, FILM COATED ORAL at 17:25

## 2024-01-14 NOTE — PATIENT PROFILE ADULT - FUNCTIONAL ASSESSMENT - BASIC MOBILITY 6.
2-calculated by average/Not able to assess (calculate score using [facility] averaging method)  2-calculated by average/Not able to assess (calculate score using [facility] averaging method)

## 2024-01-14 NOTE — PATIENT PROFILE ADULT - FALL HARM RISK - HARM RISK INTERVENTIONS
Assistance with ambulation/Assistance OOB with selected safe patient handling equipment/Communicate Risk of Fall with Harm to all staff/Reinforce activity limits and safety measures with patient and family/Tailored Fall Risk Interventions/Visual Cue: Yellow wristband and red socks/Bed in lowest position, wheels locked, appropriate side rails in place/Call bell, personal items and telephone in reach/Instruct patient to call for assistance before getting out of bed or chair/Non-slip footwear when patient is out of bed/Left Hand to call system/Physically safe environment - no spills, clutter or unnecessary equipment/Purposeful Proactive Rounding/Room/bathroom lighting operational, light cord in reach Assistance with ambulation/Assistance OOB with selected safe patient handling equipment/Communicate Risk of Fall with Harm to all staff/Reinforce activity limits and safety measures with patient and family/Tailored Fall Risk Interventions/Visual Cue: Yellow wristband and red socks/Bed in lowest position, wheels locked, appropriate side rails in place/Call bell, personal items and telephone in reach/Instruct patient to call for assistance before getting out of bed or chair/Non-slip footwear when patient is out of bed/[place] to call system/Physically safe environment - no spills, clutter or unnecessary equipment/Purposeful Proactive Rounding/Room/bathroom lighting operational, light cord in reach

## 2024-01-14 NOTE — PROGRESS NOTE ADULT - SUBJECTIVE AND OBJECTIVE BOX
Patient is a 58y old  Female who presents with a chief complaint of Suspected CVA (14 Jan 2024 14:12)      INTERVAL HPI/OVERNIGHT EVENTS: overnight no acute events. RUE weakness resolved. sugars elevated this am.     MEDICATIONS  (STANDING):  atorvastatin 80 milliGRAM(s) Oral at bedtime  dextrose 5%. 1000 milliLiter(s) (50 mL/Hr) IV Continuous <Continuous>  dextrose 5%. 1000 milliLiter(s) (100 mL/Hr) IV Continuous <Continuous>  dextrose 50% Injectable 25 Gram(s) IV Push once  dextrose 50% Injectable 25 Gram(s) IV Push once  dextrose 50% Injectable 12.5 Gram(s) IV Push once  gabapentin 100 milliGRAM(s) Oral daily  glucagon  Injectable 1 milliGRAM(s) IntraMuscular once  influenza   Vaccine 0.5 milliLiter(s) IntraMuscular once  insulin glargine Injectable (LANTUS) 52 Unit(s) SubCutaneous at bedtime  insulin lispro (ADMELOG) corrective regimen sliding scale   SubCutaneous three times a day before meals  insulin lispro (ADMELOG) corrective regimen sliding scale   SubCutaneous at bedtime  insulin lispro Injectable (ADMELOG) 10 Unit(s) SubCutaneous three times a day before meals  levETIRAcetam 500 milliGRAM(s) Oral two times a day    MEDICATIONS  (PRN):  acetaminophen     Tablet .. 650 milliGRAM(s) Oral every 6 hours PRN Temp greater or equal to 38C (100.4F), Mild Pain (1 - 3)  aluminum hydroxide/magnesium hydroxide/simethicone Suspension 30 milliLiter(s) Oral every 4 hours PRN Dyspepsia  dextrose Oral Gel 15 Gram(s) Oral once PRN Blood Glucose LESS THAN 70 milliGRAM(s)/deciliter  melatonin 3 milliGRAM(s) Oral at bedtime PRN Insomnia  ondansetron Injectable 4 milliGRAM(s) IV Push every 8 hours PRN Nausea and/or Vomiting      Allergies    No Known Allergies    Intolerances        REVIEW OF SYSTEMS:  CONSTITUTIONAL: No fever, weight loss, or fatigue  EYES: No eye pain, visual disturbances, or discharge  ENMT:  No difficulty hearing, tinnitus, vertigo; No sinus or throat pain  NECK: No pain or stiffness  BREASTS: No pain, masses, or nipple discharge  RESPIRATORY: No cough, wheezing, chills or hemoptysis; No shortness of breath  CARDIOVASCULAR: No chest pain, palpitations, dizziness, or leg swelling  GASTROINTESTINAL: No abdominal or epigastric pain. No nausea, vomiting, or hematemesis; No diarrhea or constipation. No melena or hematochezia.  GENITOURINARY: No dysuria, frequency, hematuria, or incontinence  NEUROLOGICAL: No headaches, memory loss, loss of strength, numbness, or tremors  SKIN: No itching, burning, rashes, or lesions   LYMPH NODES: No enlarged glands  ENDOCRINE: No heat or cold intolerance; No hair loss  MUSCULOSKELETAL: No joint pain or swelling; No muscle, back, or extremity pain  PSYCHIATRIC: No depression, anxiety, mood swings, or difficulty sleeping  HEME/LYMPH: No easy bruising, or bleeding gums  ALLERGY AND IMMUNOLOGIC: No hives or eczema    Vital Signs Last 24 Hrs  T(C): 37.3 (14 Jan 2024 16:03), Max: 37.3 (14 Jan 2024 16:03)  T(F): 99.1 (14 Jan 2024 16:03), Max: 99.1 (14 Jan 2024 16:03)  HR: 90 (14 Jan 2024 16:03) (79 - 95)  BP: 166/91 (14 Jan 2024 16:03) (148/78 - 198/114)  BP(mean): --  RR: 18 (14 Jan 2024 16:03) (17 - 20)  SpO2: 94% (14 Jan 2024 16:03) (94% - 100%)    Parameters below as of 14 Jan 2024 16:03  Patient On (Oxygen Delivery Method): room air        PHYSICAL EXAM:  GENERAL: NAD, obese female   HEAD:  Atraumatic   EYES: EOMI   ENMT: Moist mucous membranes  NECK: Supple   NERVOUS SYSTEM:  Awake  CHEST/LUNG: CTAB   HEART: RRR   ABDOMEN: Soft, Nontender, Nondistended  EXTREMITIES:   No clubbing, cyanosis, or edema  PSYCH: Mood appropriate    LABS:                        15.1   6.47  )-----------( 227      ( 13 Jan 2024 16:20 )             45.6     01-13    137  |  101  |  21  ----------------------------<  532<HH>  4.2   |  29  |  1.14    Ca    8.9      13 Jan 2024 16:20    TPro  6.8  /  Alb  3.1<L>  /  TBili  0.5  /  DBili  x   /  AST  17  /  ALT  22  /  AlkPhos  93  01-13    PT/INR - ( 13 Jan 2024 16:20 )   PT: 11.1 sec;   INR: 0.92 ratio         PTT - ( 13 Jan 2024 16:20 )  PTT:28.6 sec  Urinalysis Basic - ( 13 Jan 2024 16:20 )    Color: Yellow / Appearance: Clear / SG: >1.030 / pH: x  Gluc: 532 mg/dL / Ketone: Negative mg/dL  / Bili: Negative / Urobili: 0.2 mg/dL   Blood: x / Protein: 100 mg/dL / Nitrite: Negative   Leuk Esterase: Negative / RBC: 3 /HPF / WBC 5 /HPF   Sq Epi: x / Non Sq Epi: x / Bacteria: x      CAPILLARY BLOOD GLUCOSE      POCT Blood Glucose.: 363 mg/dL (14 Jan 2024 16:50)  POCT Blood Glucose.: 309 mg/dL (14 Jan 2024 14:14)  POCT Blood Glucose.: 338 mg/dL (14 Jan 2024 10:49)  POCT Blood Glucose.: 305 mg/dL (14 Jan 2024 07:56)  POCT Blood Glucose.: 231 mg/dL (13 Jan 2024 23:42)  POCT Blood Glucose.: 193 mg/dL (13 Jan 2024 18:38)      RADIOLOGY & ADDITIONAL TESTS:    Imaging Personally Reviewed:  [ X] YES  [ ] NO    Consultant(s) Notes Reviewed:  [ X] YES  [ ] NO    Care Discussed with Consultants/Other Providers [X ] YES  [ ] NO Posterior Auricular Interpolation Flap Text: A decision was made to reconstruct the defect utilizing an interpolation axial flap and a staged reconstruction.  A telfa template was made of the defect.  This telfa template was then used to outline the posterior auricular interpolation flap.  The donor area for the pedicle flap was then injected with anesthesia.  The flap was excised through the skin and subcutaneous tissue down to the layer of the underlying musculature.  The pedicle flap was carefully excised within this deep plane to maintain its blood supply.  The edges of the donor site were undermined.   The donor site was closed in a primary fashion.  The pedicle was then rotated into position and sutured.  Once the tube was sutured into place, adequate blood supply was confirmed with blanching and refill.  The pedicle was then wrapped with xeroform gauze and dressed appropriately with a telfa and gauze bandage to ensure continued blood supply and protect the attached pedicle.

## 2024-01-14 NOTE — PROGRESS NOTE ADULT - ASSESSMENT
Tiffanie Burrell is a 58 year old female with PMHx of HTN, HLD, IDDM2, and hx of TIA who presented to the ED on 1/13/24 for complaints of right upper extremity weakness and admitted for suspected CVA.    Suspected CVA  Complaints of not feeling well while at [employer] around 10-11 AM today, felt disoriented and with RUE weakness afterwards  NIHSS 0 as per ER physician documentation  Physical exam without focal neurological deficits  NCT stroke protocol with tiny focus of hyperattenuation in the left caudate head nucleus, suspected cavernoma, cannot exclude tiny hemorrhage  Repeat CT head (6 hours later) with stable small hyperdense focus in the left caudate nucleus head  Empirically started on levetiracetam 500 mg BID x 7 days for seizure prophylaxis as per neurosurgery  Neuro checks q4, PTA atorvastatin 80 mg, will defer initiation of ASA 81 mg to neurology given ?tiny hemorrhage on initial CT head  Allowing for permissive HTN up to 220/120 for first 24-48 hours  TSH, lipid panel, A1c for risk stratification  F/u echo with bubble, MRI brain with contrast  PT/OT consulted  Neurologist consulted.   Neurosurgery recommendations appreciated, no indication for transfer given CT head is stable  Telemetry    IDDM2 with hyperglycemia  Reports she ran out of insulin four days ago  Blood glucose 532 on admission, bicarb and anion gap WNL, U/A with glucosuria  S/p 1L NS bolus and insulin 5 U IV in the ED  Lantus/lispro/ ISS  Accucheck AC/HS      Demand ischemia secondary to above  Troponin 91.7 on admission  F/u serial trops, echocardiogram  Telemetry      HTN: PTA clonidine 0.3 mg patch and labetalol 200 mg BID held due to allowing for permissive HTN    HLD: PTA atorvastatin 80 mg     Tiffanie Burrell is a 58 year old female with PMHx of HTN, HLD, IDDM2, and hx of TIA who presented to the ED on 1/13/24 for complaints of right upper extremity weakness and admitted for suspected CVA.    Suspected CVA  Complaints of not feeling well while at [employer] around 10-11 AM today, felt disoriented and with RUE weakness afterwards  NIHSS 0 as per ER physician documentation  Physical exam without focal neurological deficits  NCT stroke protocol with tiny focus of hyperattenuation in the left caudate head nucleus, suspected cavernoma, cannot exclude tiny hemorrhage  Repeat CT head (6 hours later) with stable small hyperdense focus in the left caudate nucleus head  Empirically started on levetiracetam 500 mg BID x 7 days for seizure prophylaxis as per neurosurgery  Neuro checks q4, PTA atorvastatin 80 mg, will defer initiation of ASA 81 mg to neurology given ?tiny hemorrhage on initial CT head  Allowing for permissive HTN up to 220/120 for first 24-48 hours  TSH, lipid panel, A1c for risk stratification  F/u echo with bubble, MRI brain with contrast  PT/OT consulted  Neurologist consulted.   Neurosurgery recommendations appreciated, no indication for transfer given CT head is stable  Telemetry    IDDM2 with hyperglycemia  Reports she ran out of insulin four days ago  Blood glucose 532 on admission, bicarb and anion gap WNL, U/A with glucosuria  S/p 1L NS bolus and insulin 5 U IV in the ED  Lantus/lispro/ ISS  Accucheck AC/HS      Demand ischemia secondary to above  Troponin 91.7 on admission  F/u serial trops, echocardiogram  Telemetry      HTN: PTA clonidine 0.3 mg patch and labetalol 200 mg BID held due to allowing for permissive HTN    HLD: PTA atorvastatin 80 mg

## 2024-01-15 LAB
ANION GAP SERPL CALC-SCNC: 1 MMOL/L — LOW (ref 5–17)
ANION GAP SERPL CALC-SCNC: 1 MMOL/L — LOW (ref 5–17)
BUN SERPL-MCNC: 21 MG/DL — SIGNIFICANT CHANGE UP (ref 7–23)
BUN SERPL-MCNC: 21 MG/DL — SIGNIFICANT CHANGE UP (ref 7–23)
CALCIUM SERPL-MCNC: 9 MG/DL — SIGNIFICANT CHANGE UP (ref 8.5–10.1)
CALCIUM SERPL-MCNC: 9 MG/DL — SIGNIFICANT CHANGE UP (ref 8.5–10.1)
CHLORIDE SERPL-SCNC: 107 MMOL/L — SIGNIFICANT CHANGE UP (ref 96–108)
CHLORIDE SERPL-SCNC: 107 MMOL/L — SIGNIFICANT CHANGE UP (ref 96–108)
CO2 SERPL-SCNC: 31 MMOL/L — SIGNIFICANT CHANGE UP (ref 22–31)
CO2 SERPL-SCNC: 31 MMOL/L — SIGNIFICANT CHANGE UP (ref 22–31)
CREAT SERPL-MCNC: 0.93 MG/DL — SIGNIFICANT CHANGE UP (ref 0.5–1.3)
CREAT SERPL-MCNC: 0.93 MG/DL — SIGNIFICANT CHANGE UP (ref 0.5–1.3)
CULTURE RESULTS: SIGNIFICANT CHANGE UP
CULTURE RESULTS: SIGNIFICANT CHANGE UP
EGFR: 70 ML/MIN/1.73M2 — SIGNIFICANT CHANGE UP
EGFR: 70 ML/MIN/1.73M2 — SIGNIFICANT CHANGE UP
GLUCOSE BLDC GLUCOMTR-MCNC: 195 MG/DL — HIGH (ref 70–99)
GLUCOSE BLDC GLUCOMTR-MCNC: 208 MG/DL — HIGH (ref 70–99)
GLUCOSE BLDC GLUCOMTR-MCNC: 208 MG/DL — HIGH (ref 70–99)
GLUCOSE BLDC GLUCOMTR-MCNC: 277 MG/DL — HIGH (ref 70–99)
GLUCOSE BLDC GLUCOMTR-MCNC: 277 MG/DL — HIGH (ref 70–99)
GLUCOSE BLDC GLUCOMTR-MCNC: 372 MG/DL — HIGH (ref 70–99)
GLUCOSE SERPL-MCNC: 244 MG/DL — HIGH (ref 70–99)
GLUCOSE SERPL-MCNC: 244 MG/DL — HIGH (ref 70–99)
HCT VFR BLD CALC: 44.4 % — SIGNIFICANT CHANGE UP (ref 34.5–45)
HCT VFR BLD CALC: 44.4 % — SIGNIFICANT CHANGE UP (ref 34.5–45)
HGB BLD-MCNC: 14.5 G/DL — SIGNIFICANT CHANGE UP (ref 11.5–15.5)
HGB BLD-MCNC: 14.5 G/DL — SIGNIFICANT CHANGE UP (ref 11.5–15.5)
MCHC RBC-ENTMCNC: 28.4 PG — SIGNIFICANT CHANGE UP (ref 27–34)
MCHC RBC-ENTMCNC: 28.4 PG — SIGNIFICANT CHANGE UP (ref 27–34)
MCHC RBC-ENTMCNC: 32.7 G/DL — SIGNIFICANT CHANGE UP (ref 32–36)
MCHC RBC-ENTMCNC: 32.7 G/DL — SIGNIFICANT CHANGE UP (ref 32–36)
MCV RBC AUTO: 86.9 FL — SIGNIFICANT CHANGE UP (ref 80–100)
MCV RBC AUTO: 86.9 FL — SIGNIFICANT CHANGE UP (ref 80–100)
NRBC # BLD: 0 /100 WBCS — SIGNIFICANT CHANGE UP (ref 0–0)
NRBC # BLD: 0 /100 WBCS — SIGNIFICANT CHANGE UP (ref 0–0)
PLATELET # BLD AUTO: 222 K/UL — SIGNIFICANT CHANGE UP (ref 150–400)
PLATELET # BLD AUTO: 222 K/UL — SIGNIFICANT CHANGE UP (ref 150–400)
POTASSIUM SERPL-MCNC: 4 MMOL/L — SIGNIFICANT CHANGE UP (ref 3.5–5.3)
POTASSIUM SERPL-MCNC: 4 MMOL/L — SIGNIFICANT CHANGE UP (ref 3.5–5.3)
POTASSIUM SERPL-SCNC: 4 MMOL/L — SIGNIFICANT CHANGE UP (ref 3.5–5.3)
POTASSIUM SERPL-SCNC: 4 MMOL/L — SIGNIFICANT CHANGE UP (ref 3.5–5.3)
RBC # BLD: 5.11 M/UL — SIGNIFICANT CHANGE UP (ref 3.8–5.2)
RBC # BLD: 5.11 M/UL — SIGNIFICANT CHANGE UP (ref 3.8–5.2)
RBC # FLD: 12.4 % — SIGNIFICANT CHANGE UP (ref 10.3–14.5)
RBC # FLD: 12.4 % — SIGNIFICANT CHANGE UP (ref 10.3–14.5)
SODIUM SERPL-SCNC: 139 MMOL/L — SIGNIFICANT CHANGE UP (ref 135–145)
SODIUM SERPL-SCNC: 139 MMOL/L — SIGNIFICANT CHANGE UP (ref 135–145)
SPECIMEN SOURCE: SIGNIFICANT CHANGE UP
SPECIMEN SOURCE: SIGNIFICANT CHANGE UP
WBC # BLD: 5.44 K/UL — SIGNIFICANT CHANGE UP (ref 3.8–10.5)
WBC # BLD: 5.44 K/UL — SIGNIFICANT CHANGE UP (ref 3.8–10.5)
WBC # FLD AUTO: 5.44 K/UL — SIGNIFICANT CHANGE UP (ref 3.8–10.5)
WBC # FLD AUTO: 5.44 K/UL — SIGNIFICANT CHANGE UP (ref 3.8–10.5)

## 2024-01-15 PROCEDURE — 95816 EEG AWAKE AND DROWSY: CPT | Mod: 26

## 2024-01-15 PROCEDURE — 99232 SBSQ HOSP IP/OBS MODERATE 35: CPT

## 2024-01-15 RX ADMIN — Medication 6: at 12:31

## 2024-01-15 RX ADMIN — Medication 10 UNIT(S): at 07:56

## 2024-01-15 RX ADMIN — Medication 10 UNIT(S): at 12:32

## 2024-01-15 RX ADMIN — Medication 4: at 07:56

## 2024-01-15 RX ADMIN — GABAPENTIN 100 MILLIGRAM(S): 400 CAPSULE ORAL at 12:32

## 2024-01-15 RX ADMIN — Medication 10 UNIT(S): at 17:09

## 2024-01-15 RX ADMIN — LEVETIRACETAM 500 MILLIGRAM(S): 250 TABLET, FILM COATED ORAL at 17:05

## 2024-01-15 RX ADMIN — Medication 6: at 21:21

## 2024-01-15 RX ADMIN — Medication 2: at 17:09

## 2024-01-15 RX ADMIN — ATORVASTATIN CALCIUM 80 MILLIGRAM(S): 80 TABLET, FILM COATED ORAL at 21:22

## 2024-01-15 RX ADMIN — LEVETIRACETAM 500 MILLIGRAM(S): 250 TABLET, FILM COATED ORAL at 05:38

## 2024-01-15 RX ADMIN — INSULIN GLARGINE 52 UNIT(S): 100 INJECTION, SOLUTION SUBCUTANEOUS at 21:22

## 2024-01-15 NOTE — CONSULT NOTE ADULT - ASSESSMENT
58F with HTN, HLD, IDDM2, and hx of TIA? here with RUE weakness and disorientation, thought to be related to hyperglycemia as  and UA with proteinuria and glucosuria  Stroke code called in ED but NIHSS deemed to be 0. CTH with hyperdensity in the L caudate head that appeared stable on 6h scan. CTH also remarkable for chronic R PCA infarct    RUE weakness, now resolved - likely stroke mimic in the setting of hyperglycemia, r/o new ischemic event. CTH appears more consistent with calficiation than ICH  Chronic R PCA Infarct - etiology ESUS    Plan:  - hold Aspirin 81mg until MRI brain performed  - check a1c, LDL  - blood glucose control  - bp goal normotension  - vessel imaging and prolonged cardiac monitoring as outpatient for hx of R PCA infarct  - can consider routine eeg  - pt/ot  - dvt ppx      Samantha Everett DO  Vascular Neurology  Office [phone]  58F with HTN, HLD, IDDM2, and hx of TIA? here with RUE weakness and disorientation, thought to be related to hyperglycemia as  and UA with proteinuria and glucosuria  Stroke code called in ED but NIHSS deemed to be 0. CTH with hyperdensity in the L caudate head that appeared stable on 6h scan. CTH also remarkable for chronic R PCA infarct    RUE weakness, now resolved - likely stroke mimic in the setting of hyperglycemia, r/o new ischemic event. CTH appears more consistent with calficiation than ICH  Chronic R PCA Infarct - etiology ESUS    Plan:  - hold Aspirin 81mg until MRI brain performed  - check a1c, LDL  - blood glucose control  - bp goal normotension  - vessel imaging and prolonged cardiac monitoring as outpatient for hx of R PCA infarct  - can consider routine eeg  - pt/ot  - dvt ppx      Samantha Everett DO  Vascular Neurology  Office [phone]  58F with HTN, HLD, IDDM2, and hx of TIA? here with RUE weakness and disorientation, thought to be related to hyperglycemia as  and UA with proteinuria and glucosuria  Stroke code called in ED but NIHSS deemed to be 0. CTH with hyperdensity in the L caudate head that appeared stable on 6h scan. CTH also remarkable for chronic R PCA infarct    RUE weakness, now resolved - likely stroke mimic in the setting of hyperglycemia, r/o new ischemic event. CTH appears more consistent with calficiation than ICH  Chronic R PCA Infarct - etiology ESUS    Plan:  - hold Aspirin 81mg until MRI brain performed  - check a1c, LDL  - blood glucose control  - bp goal normotension  - vessel imaging and prolonged cardiac monitoring as outpatient for hx of R PCA infarct  - can consider routine eeg  - pt/ot  - dvt ppx      Samantha Everett DO  Vascular Neurology  Office [phone]  58F with HTN, HLD, IDDM2, and hx of stroke with residual R hemiparesis and speech difficulties here with RUE weakness and disorientation, thought to be related to hyperglycemia as  and UA with proteinuria and glucosuria  Stroke code called in ED but NIHSS deemed to be 0. CTH with hyperdensity in the L caudate head that appeared stable on 6h scan. CTH also remarkable for chronic R PCA infarct    Worsening of chronic R hemiparesis likely stroke mimic in the setting of hyperglycemia, r/o new ischemic event. CTH appears more consistent with cavernoma vs calcification than ICH  Chronic R PCA Infarct - etiology ESUS    Plan:  - hold Aspirin 81mg until MRI brain performed  - would get MRA head and neck along with MRI  - check a1c, LDL  - blood glucose control  - bp goal normotension  - check tte  - prolonged cardiac monitorig as outpatient  - can consider routine eeg but can likely hold off for now   - pt/ot  - dvt ppx      Samantha Everett,   Vascular Neurology  Office [phone]  58F with HTN, HLD, IDDM2, and hx of stroke with residual R hemiparesis and speech difficulties here with RUE weakness and disorientation, thought to be related to hyperglycemia as  and UA with proteinuria and glucosuria  Stroke code called in ED but NIHSS deemed to be 0. CTH with hyperdensity in the L caudate head that appeared stable on 6h scan. CTH also remarkable for chronic R PCA infarct    Worsening of chronic R hemiparesis likely stroke mimic in the setting of hyperglycemia, r/o new ischemic event. CTH appears more consistent with cavernoma vs calcification than ICH  Chronic R PCA Infarct - etiology ESUS    Plan:  - hold Aspirin 81mg until MRI brain performed  - would get MRA head and neck along with MRI  - check a1c, LDL  - blood glucose control  - bp goal normotension  - check tte  - prolonged cardiac monitorig as outpatient  - can consider routine eeg but can likely hold off for now   - pt/ot  - dvt ppx      Samantha Everett,   Vascular Neurology  Office [phone]  58F with HTN, HLD, IDDM2, and hx of stroke with residual R hemiparesis and speech difficulties here with RUE weakness and disorientation, thought to be related to hyperglycemia as  and UA with proteinuria and glucosuria  Stroke code called in ED but NIHSS deemed to be 0. CTH with hyperdensity in the L caudate head that appeared stable on 6h scan. CTH also remarkable for chronic R PCA infarct    Worsening of chronic R hemiparesis likely stroke mimic in the setting of hyperglycemia, r/o new ischemic event. CTH appears more consistent with cavernoma vs calcification than ICH  Chronic R PCA Infarct - etiology ESUS    Plan:  - hold Aspirin 81mg until MRI brain performed  - would get MRA head and neck along with MRI  - check a1c, LDL  - blood glucose control  - bp goal normotension  - check tte  - prolonged cardiac monitorig as outpatient  - can consider routine eeg but can likely hold off for now   - pt/ot  - dvt ppx      Samantha Everett,   Vascular Neurology  Office [phone]

## 2024-01-15 NOTE — PROGRESS NOTE ADULT - ASSESSMENT
Tiffanie Burrell is a 58 year old female with PMHx of HTN, HLD, IDDM2, and hx of TIA who presented to the ED on 1/13/24 for complaints of right upper extremity weakness and admitted for suspected CVA.    Suspected CVA  Complaints of not feeling well while at [employer] around 10-11 AM today, felt disoriented and with RUE weakness afterwards  NIHSS 0 as per ER physician documentation  Physical exam without focal neurological deficits  [facility] tiny focus of hyperattenuation in the left caudate head nucleus, suspected cavernoma, cannot exclude tiny hemorrhage  Repeat CT head (6 hours later) with stable small hyperdense focus in the left caudate nucleus head  Empirically started on levetiracetam 500 mg BID x 7 days for seizure prophylaxis as per neurosurgery  Neuro checks q4, PTA atorvastatin 80 mg, will defer initiation of ASA 81 mg to neurology given ?tiny hemorrhage on initial CT head  TSH, lipid panel, A1c for risk stratification  F/u echo with bubble, MRI brain with contrast  PT/OT consulted  Neurologist consulted appreciate recs   Neurosurgery recommendations appreciated, no indication for transfer given CT head is stable  Telemetry    Uncontrolled IDDM2 with hyperglycemia  Reports she ran out of insulin four days ago  A1C 13  Blood glucose 532 on admission, bicarb and anion gap WNL, U/A with glucosuria  S/p 1L NS bolus and insulin 5 U IV in the ED  Lantus/lispro/ ISS- adjusting accordingly, sugars better today   Accucheck AC/HS      Demand ischemia secondary to above  Troponin 91.7 on admission  F/u serial trops, echocardiogram  Telemetry      HTN: PTA clonidine 0.3 mg patch and labetalol 200 mg BID held due to allowing for permissive HTN    HLD: PTA atorvastatin 80 mg    DVT ppx : SCDs   Tiffanie Burrell is a 58 year old female with PMHx of HTN, HLD, IDDM2, and hx of TIA who presented to the ED on 1/13/24 for complaints of right upper extremity weakness and admitted for suspected CVA.    Suspected CVA  Complaints of not feeling well while at [employer] around 10-11 AM today, felt disoriented and with RUE weakness afterwards  NIHSS 0 as per ER physician documentation  Physical exam without focal neurological deficits  [facility] tiny focus of hyperattenuation in the left caudate head nucleus, suspected cavernoma, cannot exclude tiny hemorrhage  Repeat CT head (6 hours later) with stable small hyperdense focus in the left caudate nucleus head  Empirically started on levetiracetam 500 mg BID x 7 days for seizure prophylaxis as per neurosurgery  Neuro checks q4, PTA atorvastatin 80 mg, will defer initiation of ASA 81 mg to neurology given ?tiny hemorrhage on initial CT head  TSH, lipid panel, A1c for risk stratification  F/u echo with bubble, MRI brain with contrast  PT/OT consulted  Neurologist consulted appreciate recs   Neurosurgery recommendations appreciated, no indication for transfer given CT head is stable  Telemetry    Uncontrolled IDDM2 with hyperglycemia  Reports she ran out of insulin four days ago  A1C 13  Blood glucose 532 on admission, bicarb and anion gap WNL, U/A with glucosuria  S/p 1L NS bolus and insulin 5 U IV in the ED  Lantus/lispro/ ISS- adjusting accordingly, sugars better today   Accucheck AC/HS      Demand ischemia secondary to above  Troponin 91.7 on admission  F/u serial trops, echocardiogram  Telemetry      HTN: PTA clonidine 0.3 mg patch and labetalol 200 mg BID held due to allowing for permissive HTN    HLD: PTA atorvastatin 80 mg    DVT ppx : SCDs   Tiffanie Burrell is a 58 year old female with PMHx of HTN, HLD, IDDM2, and hx of TIA who presented to the ED on 1/13/24 for complaints of right upper extremity weakness and admitted for suspected CVA.    Suspected CVA  Complaints of not feeling well while at [employer] around 10-11 AM today, felt disoriented and with RUE weakness afterwards  NIHSS 0 as per ER physician documentation  Physical exam without focal neurological deficits  [facility] with tiny focus of hyperattenuation in the left caudate head nucleus, suspected cavernoma, cannot exclude tiny hemorrhage  Repeat CT head (6 hours later) with stable small hyperdense focus in the left caudate nucleus head  Empirically started on levetiracetam 500 mg BID x 7 days for seizure prophylaxis as per neurosurgery  Neuro checks q4, PTA atorvastatin 80 mg, will defer initiation of ASA 81 mg to neurology given ?tiny hemorrhage on initial CT head  TSH, lipid panel, A1c for risk stratification  F/u echo with bubble, MRI brain with contrast  PT/OT consulted  Neurologist consulted appreciate recs   Neurosurgery recommendations appreciated, no indication for transfer given CT head is stable  Telemetry    Uncontrolled IDDM2 with hyperglycemia  Reports she ran out of insulin four days ago  A1C 13  Blood glucose 532 on admission, bicarb and anion gap WNL, U/A with glucosuria  S/p 1L NS bolus and insulin 5 U IV in the ED  Lantus/lispro/ ISS- adjusting accordingly, sugars better today   Accucheck AC/HS      Demand ischemia secondary to above  Troponin 91.7 on admission  F/u serial trops, echocardiogram  Telemetry      HTN: PTA clonidine 0.3 mg patch and labetalol 200 mg BID held due to allowing for permissive HTN    HLD: PTA atorvastatin 80 mg    DVT ppx : SCDs

## 2024-01-15 NOTE — CONSULT NOTE ADULT - SUBJECTIVE AND OBJECTIVE BOX
Neurology Consult    Reason for Consult: Patient is a 58y old  Female who presents with a chief complaint of Suspected CVA (14 Jan 2024 14:12)      HPI:  Tiffanie Burrell is a 58 year old female with PMHx of HTN, HLD, IDDM2, and hx of TIA who presented to the ED on 1/13/24 for complaints of right upper extremity weakness.    History is very limited as patient is a poor historian. Patient reports she was not feeling well this morning while at [facility] around 10-11 AM. [place] disoriented and started having RUE weakness. Her friend at [facility] called EMS. 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 Given a bolus of fluids but not admitted. States she ran out of insulin 4 days ago and there were no refills for her insulin at her pharmacy.     In the ED, VSS except HR as elevated as 95 and BP as elevated as 192/128. Labs grossly unremarkable except blood glucose 532 and troponin 91.7. U/A with proteinuria and glucosuria. Initially was called as a code stroke given EMS report but given NIHSS 0, ER physician cancelled CTA H/N. [facility] stroke protocol with tiny focus of hyperattenuation in the left caudate head nucleus. Suspected cavernoma. Cannot exclude tiny hemorrhage. ER physician, Dr. Kee spoke with neurosurgery, Dr. Liza Gary who recommended repeat CT head in 6 hours and if stable, no indication for transfer but to start prophylactic keppra 500 mg x 7 days for seizure prevention. Repeat CT head with small hyperdense focus in the left caudate nucleus head remains stable. Received 1L NS bolus, insulin 5 U IV, acetaminophen 1 g IV, morphine 4 mg IV, and metoclopramide 10 mg IV. Back to baseline at the time of my examination. (13 Jan 2024 23:51)       PAST MEDICAL & SURGICAL HISTORY:  HTN (hypertension)      HLD (hyperlipidemia)      Insulin dependent type 2 diabetes mellitus      History of TIA (transient ischemic attack)          Allergies: Allergies    No Known Allergies    Intolerances        Social History: Denies toxic habits including tobacco, ETOH or illicit drugs.    Family History: FAMILY HISTORY:  . No family history of strokes    Medications: MEDICATIONS  (STANDING):  atorvastatin 80 milliGRAM(s) Oral at bedtime  dextrose 5%. 1000 milliLiter(s) (50 mL/Hr) IV Continuous <Continuous>  dextrose 5%. 1000 milliLiter(s) (100 mL/Hr) IV Continuous <Continuous>  dextrose 50% Injectable 25 Gram(s) IV Push once  dextrose 50% Injectable 12.5 Gram(s) IV Push once  dextrose 50% Injectable 25 Gram(s) IV Push once  gabapentin 100 milliGRAM(s) Oral daily  glucagon  Injectable 1 milliGRAM(s) IntraMuscular once  influenza   Vaccine 0.5 milliLiter(s) IntraMuscular once  insulin glargine Injectable (LANTUS) 52 Unit(s) SubCutaneous at bedtime  insulin lispro (ADMELOG) corrective regimen sliding scale   SubCutaneous three times a day before meals  insulin lispro (ADMELOG) corrective regimen sliding scale   SubCutaneous at bedtime  insulin lispro Injectable (ADMELOG) 10 Unit(s) SubCutaneous three times a day before meals  levETIRAcetam 500 milliGRAM(s) Oral two times a day    MEDICATIONS  (PRN):  acetaminophen     Tablet .. 650 milliGRAM(s) Oral every 6 hours PRN Temp greater or equal to 38C (100.4F), Mild Pain (1 - 3)  aluminum hydroxide/magnesium hydroxide/simethicone Suspension 30 milliLiter(s) Oral every 4 hours PRN Dyspepsia  dextrose Oral Gel 15 Gram(s) Oral once PRN Blood Glucose LESS THAN 70 milliGRAM(s)/deciliter  melatonin 3 milliGRAM(s) Oral at bedtime PRN Insomnia  ondansetron Injectable 4 milliGRAM(s) IV Push every 8 hours PRN Nausea and/or Vomiting      Review of Systems:  CONSTITUTIONAL:  No weight loss, fever, chills, weakness or fatigue.  HEENT:  Eyes:  No visual loss, blurred vision, double vision or yellow sclera. Ears, Nose, Throat:  No hearing loss, sneezing, congestion, runny nose or sore throat.  SKIN:  No rash or itching.  CARDIOVASCULAR:  No chest pain, chest pressure or chest discomfort. No palpitations or edema.  RESPIRATORY:  No shortness of breath, cough or sputum.  GASTROINTESTINAL:  No anorexia, nausea, vomiting or diarrhea. No abdominal pain or blood.  GENITOURINARY:  No burning on urination or incontinence   NEUROLOGICAL:  No headache, dizziness, syncope, paralysis, ataxia, numbness or tingling in the extremities. No change in bowel or bladder control. no limb weakness. no vision changes.   MUSCULOSKELETAL:  No muscle, back pain, joint pain or stiffness.  HEMATOLOGIC:  No anemia, bleeding or bruising.  LYMPHATICS:  No enlarged nodes. No history of splenectomy.  PSYCHIATRIC:  No history of depression or anxiety.  ENDOCRINOLOGIC:  No reports of sweating, cold or heat intolerance. No polyuria or polydipsia.      Vitals:  Vital Signs Last 24 Hrs  T(C): 36.8 (15 Dexter 2024 05:00), Max: 37.3 (14 Jan 2024 16:03)  T(F): 98.2 (15 Dexter 2024 05:00), Max: 99.1 (14 Jan 2024 16:03)  HR: 83 (15 Dexter 2024 05:00) (76 - 92)  BP: 176/94 (15 Dexter 2024 05:00) (148/78 - 176/94)  BP(mean): --  RR: 18 (15 Dexter 2024 05:00) (18 - 18)  SpO2: 96% (15 Dexter 2024 05:00) (92% - 96%)    Parameters below as of 15 Dexter 2024 05:00  Patient On (Oxygen Delivery Method): room air        General Exam:   General Appearance: Appropriately dressed and in no acute distress       Head: Normocephalic, atraumatic and no dysmorphic features  Ear, Nose, and Throat: Moist mucous membranes  CVS: S1S2+  Resp: No SOB, no wheeze or rhonchi  GI: soft NT/ND  Extremities: No edema or cyanosis  Skin: No bruises or rashes     Neurological Exam:  Mental Status: Awake, alert and oriented x 3.  Able to follow simple and complex verbal commands. Able to name and repeat. fluent speech. No obvious aphasia or dysarthria noted.   Cranial Nerves: PERRL, EOMI, VFFC, sensation V1-V3 intact,  no obvious facial asymmetry, equal elevation of palate, scm/trap 5/5, tongue is midline on protrusion. no obvious papilledema on fundoscopic exam. hearing is grossly intact.   Motor: Normal bulk, tone and strength throughout. Fine finger movements were intact and symmetric. no tremors or drift noted.    Sensation: Intact to light touch and pinprick throughout. no right/left confusion. no extinction to tactile on DSS. Romberg was negative.   Reflexes: 1+ throughout at biceps, brachioradialis, triceps, patellars and ankles bilaterally and equal. No clonus. R toe and L toe were both downgoing.  Coordination: No dysmetria on FNF or HKS  Gait: Narrow based and steady. Able to tandem. no limitations in gait.     Data/Labs/Imaging which I personally reviewed.     Labs:     CBC Full  -  ( 13 Jan 2024 16:20 )  WBC Count : 6.47 K/uL  RBC Count : 5.27 M/uL  Hemoglobin : 15.1 g/dL  Hematocrit : 45.6 %  Platelet Count - Automated : 227 K/uL  Mean Cell Volume : 86.5 fl  Mean Cell Hemoglobin : 28.7 pg  Mean Cell Hemoglobin Concentration : 33.1 g/dL  Auto Neutrophil # : 4.10 K/uL  Auto Lymphocyte # : 1.86 K/uL  Auto Monocyte # : 0.44 K/uL  Auto Eosinophil # : 0.03 K/uL  Auto Basophil # : 0.03 K/uL  Auto Neutrophil % : 63.3 %  Auto Lymphocyte % : 28.7 %  Auto Monocyte % : 6.8 %  Auto Eosinophil % : 0.5 %  Auto Basophil % : 0.5 %    01-13    137  |  101  |  21  ----------------------------<  532<HH>  4.2   |  29  |  1.14    Ca    8.9      13 Jan 2024 16:20    TPro  6.8  /  Alb  3.1<L>  /  TBili  0.5  /  DBili  x   /  AST  17  /  ALT  22  /  AlkPhos  93  01-13    LIVER FUNCTIONS - ( 13 Jan 2024 16:20 )  Alb: 3.1 g/dL / Pro: 6.8 gm/dL / ALK PHOS: 93 U/L / ALT: 22 U/L / AST: 17 U/L / GGT: x           PT/INR - ( 13 Jan 2024 16:20 )   PT: 11.1 sec;   INR: 0.92 ratio         PTT - ( 13 Jan 2024 16:20 )  PTT:28.6 sec  Urinalysis Basic - ( 13 Jan 2024 16:20 )    Color: Yellow / Appearance: Clear / SG: >1.030 / pH: x  Gluc: 532 mg/dL / Ketone: Negative mg/dL  / Bili: Negative / Urobili: 0.2 mg/dL   Blood: x / Protein: 100 mg/dL / Nitrite: Negative   Leuk Esterase: Negative / RBC: 3 /HPF / WBC 5 /HPF   Sq Epi: x / Non Sq Epi: x / Bacteria: x          < from: CT Brain Stroke Protocol (01.13.24 @ 15:42) >  IMPRESSION:  HEAD CT: Mild volume loss, microvascular disease, no midline shift.  Tiny focus of hyperattenuation in the left caudate head nucleus.   Suspected cavernoma. Cannot exclude tiny hemorrhage. MRI with gadolinium   may be helpful for further evaluation, if clinically indicated.  Encephalomalacia and gliosis in the right occipital lobe.    < end of copied text >  < from: CT Head No Cont (01.13.24 @ 21:04) >  IMPRESSION:    Small hyperdense focus in the left caudate nucleus head remains stable.   It may be further assessed and characterized with contrast-enhanced MRI   imaging, if clinically indicated.    < end of copied text >   Neurology Consult    Reason for Consult: Patient is a 58y old  Female who presents with a chief complaint of Suspected CVA (14 Jan 2024 14:12)      HPI:  Tiffanie Burerll is a 58 year old female with PMHx of HTN, HLD, IDDM2, and hx of TIA who presented to the ED on 1/13/24 for complaints of right upper extremity weakness.    History is very limited as patient is a poor historian. Patient reports she was not feeling well this morning while at [facility] around 10-11 AM. [place] disoriented and started having RUE weakness. Her friend at [facility] called EMS. 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 Given a bolus of fluids but not admitted. States she ran out of insulin 4 days ago and there were no refills for her insulin at her pharmacy.     In the ED, VSS except HR as elevated as 95 and BP as elevated as 192/128. Labs grossly unremarkable except blood glucose 532 and troponin 91.7. U/A with proteinuria and glucosuria. Initially was called as a code stroke given EMS report but given NIHSS 0, ER physician cancelled CTA H/N. [facility] stroke protocol with tiny focus of hyperattenuation in the left caudate head nucleus. Suspected cavernoma. Cannot exclude tiny hemorrhage. ER physician, Dr. Kee spoke with neurosurgery, Dr. Liza Gary who recommended repeat CT head in 6 hours and if stable, no indication for transfer but to start prophylactic keppra 500 mg x 7 days for seizure prevention. Repeat CT head with small hyperdense focus in the left caudate nucleus head remains stable. Received 1L NS bolus, insulin 5 U IV, acetaminophen 1 g IV, morphine 4 mg IV, and metoclopramide 10 mg IV. Back to baseline at the time of my examination. (13 Jan 2024 23:51)       PAST MEDICAL & SURGICAL HISTORY:  HTN (hypertension)      HLD (hyperlipidemia)      Insulin dependent type 2 diabetes mellitus      History of TIA (transient ischemic attack)          Allergies: Allergies    No Known Allergies    Intolerances        Social History: Denies toxic habits including tobacco, ETOH or illicit drugs.    Family History: FAMILY HISTORY:  . No family history of strokes    Medications: MEDICATIONS  (STANDING):  atorvastatin 80 milliGRAM(s) Oral at bedtime  dextrose 5%. 1000 milliLiter(s) (50 mL/Hr) IV Continuous <Continuous>  dextrose 5%. 1000 milliLiter(s) (100 mL/Hr) IV Continuous <Continuous>  dextrose 50% Injectable 25 Gram(s) IV Push once  dextrose 50% Injectable 12.5 Gram(s) IV Push once  dextrose 50% Injectable 25 Gram(s) IV Push once  gabapentin 100 milliGRAM(s) Oral daily  glucagon  Injectable 1 milliGRAM(s) IntraMuscular once  influenza   Vaccine 0.5 milliLiter(s) IntraMuscular once  insulin glargine Injectable (LANTUS) 52 Unit(s) SubCutaneous at bedtime  insulin lispro (ADMELOG) corrective regimen sliding scale   SubCutaneous three times a day before meals  insulin lispro (ADMELOG) corrective regimen sliding scale   SubCutaneous at bedtime  insulin lispro Injectable (ADMELOG) 10 Unit(s) SubCutaneous three times a day before meals  levETIRAcetam 500 milliGRAM(s) Oral two times a day    MEDICATIONS  (PRN):  acetaminophen     Tablet .. 650 milliGRAM(s) Oral every 6 hours PRN Temp greater or equal to 38C (100.4F), Mild Pain (1 - 3)  aluminum hydroxide/magnesium hydroxide/simethicone Suspension 30 milliLiter(s) Oral every 4 hours PRN Dyspepsia  dextrose Oral Gel 15 Gram(s) Oral once PRN Blood Glucose LESS THAN 70 milliGRAM(s)/deciliter  melatonin 3 milliGRAM(s) Oral at bedtime PRN Insomnia  ondansetron Injectable 4 milliGRAM(s) IV Push every 8 hours PRN Nausea and/or Vomiting      Review of Systems:  CONSTITUTIONAL:  No weight loss, fever, chills, weakness or fatigue.  HEENT:  Eyes:  No visual loss, blurred vision, double vision or yellow sclera. Ears, Nose, Throat:  No hearing loss, sneezing, congestion, runny nose or sore throat.  SKIN:  No rash or itching.  CARDIOVASCULAR:  No chest pain, chest pressure or chest discomfort. No palpitations or edema.  RESPIRATORY:  No shortness of breath, cough or sputum.  GASTROINTESTINAL:  No anorexia, nausea, vomiting or diarrhea. No abdominal pain or blood.  GENITOURINARY:  No burning on urination or incontinence   NEUROLOGICAL:  No headache, dizziness, syncope, paralysis, ataxia, numbness or tingling in the extremities. No change in bowel or bladder control. no limb weakness. no vision changes.   MUSCULOSKELETAL:  No muscle, back pain, joint pain or stiffness.  HEMATOLOGIC:  No anemia, bleeding or bruising.  LYMPHATICS:  No enlarged nodes. No history of splenectomy.  PSYCHIATRIC:  No history of depression or anxiety.  ENDOCRINOLOGIC:  No reports of sweating, cold or heat intolerance. No polyuria or polydipsia.      Vitals:  Vital Signs Last 24 Hrs  T(C): 36.8 (15 Dexter 2024 05:00), Max: 37.3 (14 Jan 2024 16:03)  T(F): 98.2 (15 Dexter 2024 05:00), Max: 99.1 (14 Jan 2024 16:03)  HR: 83 (15 Dexter 2024 05:00) (76 - 92)  BP: 176/94 (15 Dexter 2024 05:00) (148/78 - 176/94)  BP(mean): --  RR: 18 (15 Dexter 2024 05:00) (18 - 18)  SpO2: 96% (15 Dexter 2024 05:00) (92% - 96%)    Parameters below as of 15 Dexter 2024 05:00  Patient On (Oxygen Delivery Method): room air        General Exam:   General Appearance: Appropriately dressed and in no acute distress       Head: Normocephalic, atraumatic and no dysmorphic features  Ear, Nose, and Throat: Moist mucous membranes  CVS: S1S2+  Resp: No SOB, no wheeze or rhonchi  GI: soft NT/ND  Extremities: No edema or cyanosis  Skin: No bruises or rashes     Neurological Exam:  Mental Status: Awake, alert and oriented x 3.  Able to follow simple and complex verbal commands. Able to name and repeat. fluent speech. No obvious aphasia or dysarthria noted.   Cranial Nerves: PERRL, EOMI, VFFC, sensation V1-V3 intact,  no obvious facial asymmetry, equal elevation of palate, scm/trap 5/5, tongue is midline on protrusion. no obvious papilledema on fundoscopic exam. hearing is grossly intact.   Motor: Normal bulk, tone and strength throughout. Fine finger movements were intact and symmetric. no tremors or drift noted.    Sensation: Intact to light touch and pinprick throughout. no right/left confusion. no extinction to tactile on DSS. Romberg was negative.   Reflexes: 1+ throughout at biceps, brachioradialis, triceps, patellars and ankles bilaterally and equal. No clonus. R toe and L toe were both downgoing.  Coordination: No dysmetria on FNF or HKS  Gait: Narrow based and steady. Able to tandem. no limitations in gait.     Data/Labs/Imaging which I personally reviewed.     Labs:     CBC Full  -  ( 13 Jan 2024 16:20 )  WBC Count : 6.47 K/uL  RBC Count : 5.27 M/uL  Hemoglobin : 15.1 g/dL  Hematocrit : 45.6 %  Platelet Count - Automated : 227 K/uL  Mean Cell Volume : 86.5 fl  Mean Cell Hemoglobin : 28.7 pg  Mean Cell Hemoglobin Concentration : 33.1 g/dL  Auto Neutrophil # : 4.10 K/uL  Auto Lymphocyte # : 1.86 K/uL  Auto Monocyte # : 0.44 K/uL  Auto Eosinophil # : 0.03 K/uL  Auto Basophil # : 0.03 K/uL  Auto Neutrophil % : 63.3 %  Auto Lymphocyte % : 28.7 %  Auto Monocyte % : 6.8 %  Auto Eosinophil % : 0.5 %  Auto Basophil % : 0.5 %    01-13    137  |  101  |  21  ----------------------------<  532<HH>  4.2   |  29  |  1.14    Ca    8.9      13 Jan 2024 16:20    TPro  6.8  /  Alb  3.1<L>  /  TBili  0.5  /  DBili  x   /  AST  17  /  ALT  22  /  AlkPhos  93  01-13    LIVER FUNCTIONS - ( 13 Jan 2024 16:20 )  Alb: 3.1 g/dL / Pro: 6.8 gm/dL / ALK PHOS: 93 U/L / ALT: 22 U/L / AST: 17 U/L / GGT: x           PT/INR - ( 13 Jan 2024 16:20 )   PT: 11.1 sec;   INR: 0.92 ratio         PTT - ( 13 Jan 2024 16:20 )  PTT:28.6 sec  Urinalysis Basic - ( 13 Jan 2024 16:20 )    Color: Yellow / Appearance: Clear / SG: >1.030 / pH: x  Gluc: 532 mg/dL / Ketone: Negative mg/dL  / Bili: Negative / Urobili: 0.2 mg/dL   Blood: x / Protein: 100 mg/dL / Nitrite: Negative   Leuk Esterase: Negative / RBC: 3 /HPF / WBC 5 /HPF   Sq Epi: x / Non Sq Epi: x / Bacteria: x          < from: CT Brain Stroke Protocol (01.13.24 @ 15:42) >  IMPRESSION:  HEAD CT: Mild volume loss, microvascular disease, no midline shift.  Tiny focus of hyperattenuation in the left caudate head nucleus.   Suspected cavernoma. Cannot exclude tiny hemorrhage. MRI with gadolinium   may be helpful for further evaluation, if clinically indicated.  Encephalomalacia and gliosis in the right occipital lobe.    < end of copied text >  < from: CT Head No Cont (01.13.24 @ 21:04) >  IMPRESSION:    Small hyperdense focus in the left caudate nucleus head remains stable.   It may be further assessed and characterized with contrast-enhanced MRI   imaging, if clinically indicated.    < end of copied text >   Neurology Consult    Reason for Consult: Patient is a 58y old  Female who presents with a chief complaint of Suspected CVA (14 Jan 2024 14:12)      HPI:  Tiffanie Burrell is a 58 year old female with PMHx of HTN, HLD, IDDM2, and hx of TIA who presented to the ED on 1/13/24 for complaints of right upper extremity weakness.    History is very limited as patient is a poor historian. Patient reports she was not feeling well this morning while at [facility] around 10-11 AM. [place] disoriented and started having RUE weakness. Her friend at [facility] called EMS. 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 Given a bolus of fluids but not admitted. States she ran out of insulin 4 days ago and there were no refills for her insulin at her pharmacy.     In the ED, VSS except HR as elevated as 95 and BP as elevated as 192/128. Labs grossly unremarkable except blood glucose 532 and troponin 91.7. U/A with proteinuria and glucosuria. Initially was called as a code stroke given EMS report but given NIHSS 0, ER physician cancelled CTA H/N. [facility] stroke protocol with tiny focus of hyperattenuation in the left caudate head nucleus. Suspected cavernoma. Cannot exclude tiny hemorrhage. ER physician, Dr. Kee spoke with neurosurgery, Dr. Liza Gary who recommended repeat CT head in 6 hours and if stable, no indication for transfer but to start prophylactic keppra 500 mg x 7 days for seizure prevention. Repeat CT head with small hyperdense focus in the left caudate nucleus head remains stable. Received 1L NS bolus, insulin 5 U IV, acetaminophen 1 g IV, morphine 4 mg IV, and metoclopramide 10 mg IV. Back to baseline at the time of my examination. (13 Jan 2024 23:51)       PAST MEDICAL & SURGICAL HISTORY:  HTN (hypertension)      HLD (hyperlipidemia)      Insulin dependent type 2 diabetes mellitus      History of TIA (transient ischemic attack)          Allergies: Allergies    No Known Allergies    Intolerances        Social History: Denies toxic habits including tobacco, ETOH or illicit drugs.    Family History: FAMILY HISTORY:  . No family history of strokes    Medications: MEDICATIONS  (STANDING):  atorvastatin 80 milliGRAM(s) Oral at bedtime  dextrose 5%. 1000 milliLiter(s) (50 mL/Hr) IV Continuous <Continuous>  dextrose 5%. 1000 milliLiter(s) (100 mL/Hr) IV Continuous <Continuous>  dextrose 50% Injectable 25 Gram(s) IV Push once  dextrose 50% Injectable 12.5 Gram(s) IV Push once  dextrose 50% Injectable 25 Gram(s) IV Push once  gabapentin 100 milliGRAM(s) Oral daily  glucagon  Injectable 1 milliGRAM(s) IntraMuscular once  influenza   Vaccine 0.5 milliLiter(s) IntraMuscular once  insulin glargine Injectable (LANTUS) 52 Unit(s) SubCutaneous at bedtime  insulin lispro (ADMELOG) corrective regimen sliding scale   SubCutaneous three times a day before meals  insulin lispro (ADMELOG) corrective regimen sliding scale   SubCutaneous at bedtime  insulin lispro Injectable (ADMELOG) 10 Unit(s) SubCutaneous three times a day before meals  levETIRAcetam 500 milliGRAM(s) Oral two times a day    MEDICATIONS  (PRN):  acetaminophen     Tablet .. 650 milliGRAM(s) Oral every 6 hours PRN Temp greater or equal to 38C (100.4F), Mild Pain (1 - 3)  aluminum hydroxide/magnesium hydroxide/simethicone Suspension 30 milliLiter(s) Oral every 4 hours PRN Dyspepsia  dextrose Oral Gel 15 Gram(s) Oral once PRN Blood Glucose LESS THAN 70 milliGRAM(s)/deciliter  melatonin 3 milliGRAM(s) Oral at bedtime PRN Insomnia  ondansetron Injectable 4 milliGRAM(s) IV Push every 8 hours PRN Nausea and/or Vomiting      Review of Systems:  CONSTITUTIONAL:  No weight loss, fever, chills, weakness or fatigue.  HEENT:  Eyes:  No visual loss, blurred vision, double vision or yellow sclera. Ears, Nose, Throat:  No hearing loss, sneezing, congestion, runny nose or sore throat.  SKIN:  No rash or itching.  CARDIOVASCULAR:  No chest pain, chest pressure or chest discomfort. No palpitations or edema.  RESPIRATORY:  No shortness of breath, cough or sputum.  GASTROINTESTINAL:  No anorexia, nausea, vomiting or diarrhea. No abdominal pain or blood.  GENITOURINARY:  No burning on urination or incontinence   NEUROLOGICAL:  No headache, dizziness, syncope, paralysis, ataxia, numbness or tingling in the extremities. No change in bowel or bladder control. no limb weakness. no vision changes.   MUSCULOSKELETAL:  No muscle, back pain, joint pain or stiffness.  HEMATOLOGIC:  No anemia, bleeding or bruising.  LYMPHATICS:  No enlarged nodes. No history of splenectomy.  PSYCHIATRIC:  No history of depression or anxiety.  ENDOCRINOLOGIC:  No reports of sweating, cold or heat intolerance. No polyuria or polydipsia.      Vitals:  Vital Signs Last 24 Hrs  T(C): 36.8 (15 Dexter 2024 05:00), Max: 37.3 (14 Jan 2024 16:03)  T(F): 98.2 (15 Dexter 2024 05:00), Max: 99.1 (14 Jan 2024 16:03)  HR: 83 (15 Dexter 2024 05:00) (76 - 92)  BP: 176/94 (15 Dexter 2024 05:00) (148/78 - 176/94)  BP(mean): --  RR: 18 (15 Dexter 2024 05:00) (18 - 18)  SpO2: 96% (15 Dexter 2024 05:00) (92% - 96%)    Parameters below as of 15 Dexter 2024 05:00  Patient On (Oxygen Delivery Method): room air        General Exam:   General Appearance: Appropriately dressed and in no acute distress       Head: Normocephalic, atraumatic and no dysmorphic features  Ear, Nose, and Throat: Moist mucous membranes  CVS: S1S2+  Resp: No SOB, no wheeze or rhonchi  GI: soft NT/ND  Extremities: No edema or cyanosis  Skin: No bruises or rashes     Neurological Exam:  Mental Status: Awake, alert and oriented x 3.  Able to follow simple and complex verbal commands. Able to name and repeat. fluent speech. No obvious aphasia or dysarthria noted.   Cranial Nerves: PERRL, EOMI, VFFC, sensation V1-V3 intact,  no obvious facial asymmetry, equal elevation of palate, scm/trap 5/5, tongue is midline on protrusion. no obvious papilledema on fundoscopic exam. hearing is grossly intact.   Motor: Normal bulk, tone and strength throughout. Fine finger movements were intact and symmetric. no tremors or drift noted.    Sensation: Intact to light touch and pinprick throughout. no right/left confusion. no extinction to tactile on DSS. Romberg was negative.   Reflexes: 1+ throughout at biceps, brachioradialis, triceps, patellars and ankles bilaterally and equal. No clonus. R toe and L toe were both downgoing.  Coordination: No dysmetria on FNF or HKS  Gait: Narrow based and steady. Able to tandem. no limitations in gait.     Data/Labs/Imaging which I personally reviewed.     Labs:     CBC Full  -  ( 13 Jan 2024 16:20 )  WBC Count : 6.47 K/uL  RBC Count : 5.27 M/uL  Hemoglobin : 15.1 g/dL  Hematocrit : 45.6 %  Platelet Count - Automated : 227 K/uL  Mean Cell Volume : 86.5 fl  Mean Cell Hemoglobin : 28.7 pg  Mean Cell Hemoglobin Concentration : 33.1 g/dL  Auto Neutrophil # : 4.10 K/uL  Auto Lymphocyte # : 1.86 K/uL  Auto Monocyte # : 0.44 K/uL  Auto Eosinophil # : 0.03 K/uL  Auto Basophil # : 0.03 K/uL  Auto Neutrophil % : 63.3 %  Auto Lymphocyte % : 28.7 %  Auto Monocyte % : 6.8 %  Auto Eosinophil % : 0.5 %  Auto Basophil % : 0.5 %    01-13    137  |  101  |  21  ----------------------------<  532<HH>  4.2   |  29  |  1.14    Ca    8.9      13 Jan 2024 16:20    TPro  6.8  /  Alb  3.1<L>  /  TBili  0.5  /  DBili  x   /  AST  17  /  ALT  22  /  AlkPhos  93  01-13    LIVER FUNCTIONS - ( 13 Jan 2024 16:20 )  Alb: 3.1 g/dL / Pro: 6.8 gm/dL / ALK PHOS: 93 U/L / ALT: 22 U/L / AST: 17 U/L / GGT: x           PT/INR - ( 13 Jan 2024 16:20 )   PT: 11.1 sec;   INR: 0.92 ratio         PTT - ( 13 Jan 2024 16:20 )  PTT:28.6 sec  Urinalysis Basic - ( 13 Jan 2024 16:20 )    Color: Yellow / Appearance: Clear / SG: >1.030 / pH: x  Gluc: 532 mg/dL / Ketone: Negative mg/dL  / Bili: Negative / Urobili: 0.2 mg/dL   Blood: x / Protein: 100 mg/dL / Nitrite: Negative   Leuk Esterase: Negative / RBC: 3 /HPF / WBC 5 /HPF   Sq Epi: x / Non Sq Epi: x / Bacteria: x          < from: CT Brain Stroke Protocol (01.13.24 @ 15:42) >  IMPRESSION:  HEAD CT: Mild volume loss, microvascular disease, no midline shift.  Tiny focus of hyperattenuation in the left caudate head nucleus.   Suspected cavernoma. Cannot exclude tiny hemorrhage. MRI with gadolinium   may be helpful for further evaluation, if clinically indicated.  Encephalomalacia and gliosis in the right occipital lobe.    < end of copied text >  < from: CT Head No Cont (01.13.24 @ 21:04) >  IMPRESSION:    Small hyperdense focus in the left caudate nucleus head remains stable.   It may be further assessed and characterized with contrast-enhanced MRI   imaging, if clinically indicated.    < end of copied text >   Neurology Consult    Reason for Consult: Patient is a 58y old  Female who presents with a chief complaint of Suspected CVA (14 Jan 2024 14:12)      HPI:  Tiffanie Burrell is a 58 year old female with PMHx of HTN, HLD, IDDM2, and hx of TIA who presented to the ED on 1/13/24 for complaints of right upper extremity weakness.    History is very limited as patient is a poor historian. Patient reports she was not feeling well this morning while at [facility] around 10-11 AM. [place] disoriented and started having RUE weakness. Her friend at [facility] called EMS. 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 Given a bolus of fluids but not admitted. States she ran out of insulin 4 days ago and there were no refills for her insulin at her pharmacy.     In the ED, VSS except HR as elevated as 95 and BP as elevated as 192/128. Labs grossly unremarkable except blood glucose 532 and troponin 91.7. U/A with proteinuria and glucosuria. Initially was called as a code stroke given EMS report but given NIHSS 0, ER physician cancelled CTA H/N. [facility] stroke protocol with tiny focus of hyperattenuation in the left caudate head nucleus. Suspected cavernoma. Cannot exclude tiny hemorrhage. ER physician, Dr. Kee spoke with neurosurgery, Dr. Liza Gary who recommended repeat CT head in 6 hours and if stable, no indication for transfer but to start prophylactic keppra 500 mg x 7 days for seizure prevention. Repeat CT head with small hyperdense focus in the left caudate nucleus head remains stable. Received 1L NS bolus, insulin 5 U IV, acetaminophen 1 g IV, morphine 4 mg IV, and metoclopramide 10 mg IV. Back to baseline at the time of my examination. (13 Jan 2024 23:51)    Pt states she had a stroke more than 10 years ago at age 43 with residual R sided weakness and difficulty speaking.      PAST MEDICAL & SURGICAL HISTORY:  HTN (hypertension)      HLD (hyperlipidemia)      Insulin dependent type 2 diabetes mellitus      History of TIA (transient ischemic attack)          Allergies: Allergies    No Known Allergies    Intolerances        Social History: Denies toxic habits including tobacco, ETOH or illicit drugs.    Family History: FAMILY HISTORY:  . No family history of strokes    Medications: MEDICATIONS  (STANDING):  atorvastatin 80 milliGRAM(s) Oral at bedtime  dextrose 5%. 1000 milliLiter(s) (50 mL/Hr) IV Continuous <Continuous>  dextrose 5%. 1000 milliLiter(s) (100 mL/Hr) IV Continuous <Continuous>  dextrose 50% Injectable 25 Gram(s) IV Push once  dextrose 50% Injectable 12.5 Gram(s) IV Push once  dextrose 50% Injectable 25 Gram(s) IV Push once  gabapentin 100 milliGRAM(s) Oral daily  glucagon  Injectable 1 milliGRAM(s) IntraMuscular once  influenza   Vaccine 0.5 milliLiter(s) IntraMuscular once  insulin glargine Injectable (LANTUS) 52 Unit(s) SubCutaneous at bedtime  insulin lispro (ADMELOG) corrective regimen sliding scale   SubCutaneous three times a day before meals  insulin lispro (ADMELOG) corrective regimen sliding scale   SubCutaneous at bedtime  insulin lispro Injectable (ADMELOG) 10 Unit(s) SubCutaneous three times a day before meals  levETIRAcetam 500 milliGRAM(s) Oral two times a day    MEDICATIONS  (PRN):  acetaminophen     Tablet .. 650 milliGRAM(s) Oral every 6 hours PRN Temp greater or equal to 38C (100.4F), Mild Pain (1 - 3)  aluminum hydroxide/magnesium hydroxide/simethicone Suspension 30 milliLiter(s) Oral every 4 hours PRN Dyspepsia  dextrose Oral Gel 15 Gram(s) Oral once PRN Blood Glucose LESS THAN 70 milliGRAM(s)/deciliter  melatonin 3 milliGRAM(s) Oral at bedtime PRN Insomnia  ondansetron Injectable 4 milliGRAM(s) IV Push every 8 hours PRN Nausea and/or Vomiting      Review of Systems:  CONSTITUTIONAL:  No weight loss, fever, chills, weakness or fatigue.  HEENT:  Eyes:  No visual loss, blurred vision, double vision or yellow sclera. Ears, Nose, Throat:  No hearing loss, sneezing, congestion, runny nose or sore throat.  SKIN:  No rash or itching.  CARDIOVASCULAR:  No chest pain, chest pressure or chest discomfort. No palpitations or edema.  RESPIRATORY:  No shortness of breath, cough or sputum.  GASTROINTESTINAL:  No anorexia, nausea, vomiting or diarrhea. No abdominal pain or blood.  GENITOURINARY:  No burning on urination or incontinence   NEUROLOGICAL:  + weakness  MUSCULOSKELETAL:  No muscle, back pain, joint pain or stiffness.  HEMATOLOGIC:  No anemia, bleeding or bruising.  LYMPHATICS:  No enlarged nodes. No history of splenectomy.  PSYCHIATRIC:  No history of depression or anxiety.  ENDOCRINOLOGIC:  No reports of sweating, cold or heat intolerance. No polyuria or polydipsia.      Vitals:  Vital Signs Last 24 Hrs  T(C): 36.8 (15 Dexter 2024 05:00), Max: 37.3 (14 Jan 2024 16:03)  T(F): 98.2 (15 Dexter 2024 05:00), Max: 99.1 (14 Jan 2024 16:03)  HR: 83 (15 Dexter 2024 05:00) (76 - 92)  BP: 176/94 (15 Dexter 2024 05:00) (148/78 - 176/94)  BP(mean): --  RR: 18 (15 Dexter 2024 05:00) (18 - 18)  SpO2: 96% (15 Dexter 2024 05:00) (92% - 96%)    Parameters below as of 15 Dexter 2024 05:00  Patient On (Oxygen Delivery Method): room air        General Exam:   General Appearance: Appropriately dressed and in no acute distress       Head: Normocephalic, atraumatic and no dysmorphic features  Ear, Nose, and Throat: Moist mucous membranes  CVS: S1S2+  Resp: No SOB, no wheeze or rhonchi  GI: soft NT/ND  Extremities: No edema or cyanosis  Skin: No bruises or rashes     Neurological Exam:  Mental Status: Awake, alert and oriented x 3.  Able to follow simple and complex verbal commands. Able to name and repeat. speech is dysfluent with occasional word finding difficulty.   Cranial Nerves: PERRL, EOMI, VFFC, sensation V1-V3 intact,  no obvious facial asymmetry, equal elevation of palate, scm/trap 5/5, tongue is midline on protrusion.  hearing is grossly intact.   Motor: RUE and RLE drift - RLE from arthritic pain   Sensation: Intact to light touch and pinprick throughout. no right/left confusion. no extinction to tactile on DSS.   Reflexes: 1+ throughout at biceps, brachioradialis, triceps, patellars and ankles bilaterally and equal. No clonus. R toe and L toe were both downgoing.  Coordination: No dysmetria on FNF  Gait:  deferred    Data/Labs/Imaging which I personally reviewed.     Labs:     CBC Full  -  ( 13 Jan 2024 16:20 )  WBC Count : 6.47 K/uL  RBC Count : 5.27 M/uL  Hemoglobin : 15.1 g/dL  Hematocrit : 45.6 %  Platelet Count - Automated : 227 K/uL  Mean Cell Volume : 86.5 fl  Mean Cell Hemoglobin : 28.7 pg  Mean Cell Hemoglobin Concentration : 33.1 g/dL  Auto Neutrophil # : 4.10 K/uL  Auto Lymphocyte # : 1.86 K/uL  Auto Monocyte # : 0.44 K/uL  Auto Eosinophil # : 0.03 K/uL  Auto Basophil # : 0.03 K/uL  Auto Neutrophil % : 63.3 %  Auto Lymphocyte % : 28.7 %  Auto Monocyte % : 6.8 %  Auto Eosinophil % : 0.5 %  Auto Basophil % : 0.5 %    01-13    137  |  101  |  21  ----------------------------<  532<HH>  4.2   |  29  |  1.14    Ca    8.9      13 Jan 2024 16:20    TPro  6.8  /  Alb  3.1<L>  /  TBili  0.5  /  DBili  x   /  AST  17  /  ALT  22  /  AlkPhos  93  01-13    LIVER FUNCTIONS - ( 13 Jan 2024 16:20 )  Alb: 3.1 g/dL / Pro: 6.8 gm/dL / ALK PHOS: 93 U/L / ALT: 22 U/L / AST: 17 U/L / GGT: x           PT/INR - ( 13 Jan 2024 16:20 )   PT: 11.1 sec;   INR: 0.92 ratio         PTT - ( 13 Jan 2024 16:20 )  PTT:28.6 sec  Urinalysis Basic - ( 13 Jan 2024 16:20 )    Color: Yellow / Appearance: Clear / SG: >1.030 / pH: x  Gluc: 532 mg/dL / Ketone: Negative mg/dL  / Bili: Negative / Urobili: 0.2 mg/dL   Blood: x / Protein: 100 mg/dL / Nitrite: Negative   Leuk Esterase: Negative / RBC: 3 /HPF / WBC 5 /HPF   Sq Epi: x / Non Sq Epi: x / Bacteria: x          < from: CT Brain Stroke Protocol (01.13.24 @ 15:42) >  IMPRESSION:  HEAD CT: Mild volume loss, microvascular disease, no midline shift.  Tiny focus of hyperattenuation in the left caudate head nucleus.   Suspected cavernoma. Cannot exclude tiny hemorrhage. MRI with gadolinium   may be helpful for further evaluation, if clinically indicated.  Encephalomalacia and gliosis in the right occipital lobe.    < end of copied text >  < from: CT Head No Cont (01.13.24 @ 21:04) >  IMPRESSION:    Small hyperdense focus in the left caudate nucleus head remains stable.   It may be further assessed and characterized with contrast-enhanced MRI   imaging, if clinically indicated.    < end of copied text >   Neurology Consult    Reason for Consult: Patient is a 58y old  Female who presents with a chief complaint of Suspected CVA (14 Jan 2024 14:12)      HPI:  Tiffanie Burrell is a 58 year old female with PMHx of HTN, HLD, IDDM2, and hx of TIA who presented to the ED on 1/13/24 for complaints of right upper extremity weakness.    History is very limited as patient is a poor historian. Patient reports she was not feeling well this morning while at [facility] around 10-11 AM. [place] disoriented and started having RUE weakness. Her friend at [facility] called EMS. 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 Given a bolus of fluids but not admitted. States she ran out of insulin 4 days ago and there were no refills for her insulin at her pharmacy.     In the ED, VSS except HR as elevated as 95 and BP as elevated as 192/128. Labs grossly unremarkable except blood glucose 532 and troponin 91.7. U/A with proteinuria and glucosuria. Initially was called as a code stroke given EMS report but given NIHSS 0, ER physician cancelled CTA H/N. [facility] stroke protocol with tiny focus of hyperattenuation in the left caudate head nucleus. Suspected cavernoma. Cannot exclude tiny hemorrhage. ER physician, Dr. Kee spoke with neurosurgery, Dr. Liza Gary who recommended repeat CT head in 6 hours and if stable, no indication for transfer but to start prophylactic keppra 500 mg x 7 days for seizure prevention. Repeat CT head with small hyperdense focus in the left caudate nucleus head remains stable. Received 1L NS bolus, insulin 5 U IV, acetaminophen 1 g IV, morphine 4 mg IV, and metoclopramide 10 mg IV. Back to baseline at the time of my examination. (13 Jan 2024 23:51)    Pt states she had a stroke more than 10 years ago at age 43 with residual R sided weakness and difficulty speaking.      PAST MEDICAL & SURGICAL HISTORY:  HTN (hypertension)      HLD (hyperlipidemia)      Insulin dependent type 2 diabetes mellitus      History of TIA (transient ischemic attack)          Allergies: Allergies    No Known Allergies    Intolerances        Social History: Denies toxic habits including tobacco, ETOH or illicit drugs.    Family History: FAMILY HISTORY:  . No family history of strokes    Medications: MEDICATIONS  (STANDING):  atorvastatin 80 milliGRAM(s) Oral at bedtime  dextrose 5%. 1000 milliLiter(s) (50 mL/Hr) IV Continuous <Continuous>  dextrose 5%. 1000 milliLiter(s) (100 mL/Hr) IV Continuous <Continuous>  dextrose 50% Injectable 25 Gram(s) IV Push once  dextrose 50% Injectable 12.5 Gram(s) IV Push once  dextrose 50% Injectable 25 Gram(s) IV Push once  gabapentin 100 milliGRAM(s) Oral daily  glucagon  Injectable 1 milliGRAM(s) IntraMuscular once  influenza   Vaccine 0.5 milliLiter(s) IntraMuscular once  insulin glargine Injectable (LANTUS) 52 Unit(s) SubCutaneous at bedtime  insulin lispro (ADMELOG) corrective regimen sliding scale   SubCutaneous three times a day before meals  insulin lispro (ADMELOG) corrective regimen sliding scale   SubCutaneous at bedtime  insulin lispro Injectable (ADMELOG) 10 Unit(s) SubCutaneous three times a day before meals  levETIRAcetam 500 milliGRAM(s) Oral two times a day    MEDICATIONS  (PRN):  acetaminophen     Tablet .. 650 milliGRAM(s) Oral every 6 hours PRN Temp greater or equal to 38C (100.4F), Mild Pain (1 - 3)  aluminum hydroxide/magnesium hydroxide/simethicone Suspension 30 milliLiter(s) Oral every 4 hours PRN Dyspepsia  dextrose Oral Gel 15 Gram(s) Oral once PRN Blood Glucose LESS THAN 70 milliGRAM(s)/deciliter  melatonin 3 milliGRAM(s) Oral at bedtime PRN Insomnia  ondansetron Injectable 4 milliGRAM(s) IV Push every 8 hours PRN Nausea and/or Vomiting      Review of Systems:  CONSTITUTIONAL:  No weight loss, fever, chills, weakness or fatigue.  HEENT:  Eyes:  No visual loss, blurred vision, double vision or yellow sclera. Ears, Nose, Throat:  No hearing loss, sneezing, congestion, runny nose or sore throat.  SKIN:  No rash or itching.  CARDIOVASCULAR:  No chest pain, chest pressure or chest discomfort. No palpitations or edema.  RESPIRATORY:  No shortness of breath, cough or sputum.  GASTROINTESTINAL:  No anorexia, nausea, vomiting or diarrhea. No abdominal pain or blood.  GENITOURINARY:  No burning on urination or incontinence   NEUROLOGICAL:  + weakness  MUSCULOSKELETAL:  No muscle, back pain, joint pain or stiffness.  HEMATOLOGIC:  No anemia, bleeding or bruising.  LYMPHATICS:  No enlarged nodes. No history of splenectomy.  PSYCHIATRIC:  No history of depression or anxiety.  ENDOCRINOLOGIC:  No reports of sweating, cold or heat intolerance. No polyuria or polydipsia.      Vitals:  Vital Signs Last 24 Hrs  T(C): 36.8 (15 Dexter 2024 05:00), Max: 37.3 (14 Jan 2024 16:03)  T(F): 98.2 (15 Dexter 2024 05:00), Max: 99.1 (14 Jan 2024 16:03)  HR: 83 (15 Dexter 2024 05:00) (76 - 92)  BP: 176/94 (15 Dexter 2024 05:00) (148/78 - 176/94)  BP(mean): --  RR: 18 (15 Dexter 2024 05:00) (18 - 18)  SpO2: 96% (15 Dexter 2024 05:00) (92% - 96%)    Parameters below as of 15 Dexter 2024 05:00  Patient On (Oxygen Delivery Method): room air        General Exam:   General Appearance: Appropriately dressed and in no acute distress       Head: Normocephalic, atraumatic and no dysmorphic features  Ear, Nose, and Throat: Moist mucous membranes  CVS: S1S2+  Resp: No SOB, no wheeze or rhonchi  GI: soft NT/ND  Extremities: No edema or cyanosis  Skin: No bruises or rashes     Neurological Exam:  Mental Status: Awake, alert and oriented x 3.  Able to follow simple and complex verbal commands. Able to name and repeat. speech is dysfluent with occasional word finding difficulty.   Cranial Nerves: PERRL, EOMI, VFFC, sensation V1-V3 intact,  no obvious facial asymmetry, equal elevation of palate, scm/trap 5/5, tongue is midline on protrusion.  hearing is grossly intact.   Motor: RUE and RLE drift - RLE from arthritic pain   Sensation: Intact to light touch and pinprick throughout. no right/left confusion. no extinction to tactile on DSS.   Reflexes: 1+ throughout at biceps, brachioradialis, triceps, patellars and ankles bilaterally and equal. No clonus. R toe and L toe were both downgoing.  Coordination: No dysmetria on FNF  Gait:  deferred    Data/Labs/Imaging which I personally reviewed.     Labs:     CBC Full  -  ( 13 Jan 2024 16:20 )  WBC Count : 6.47 K/uL  RBC Count : 5.27 M/uL  Hemoglobin : 15.1 g/dL  Hematocrit : 45.6 %  Platelet Count - Automated : 227 K/uL  Mean Cell Volume : 86.5 fl  Mean Cell Hemoglobin : 28.7 pg  Mean Cell Hemoglobin Concentration : 33.1 g/dL  Auto Neutrophil # : 4.10 K/uL  Auto Lymphocyte # : 1.86 K/uL  Auto Monocyte # : 0.44 K/uL  Auto Eosinophil # : 0.03 K/uL  Auto Basophil # : 0.03 K/uL  Auto Neutrophil % : 63.3 %  Auto Lymphocyte % : 28.7 %  Auto Monocyte % : 6.8 %  Auto Eosinophil % : 0.5 %  Auto Basophil % : 0.5 %    01-13    137  |  101  |  21  ----------------------------<  532<HH>  4.2   |  29  |  1.14    Ca    8.9      13 Jan 2024 16:20    TPro  6.8  /  Alb  3.1<L>  /  TBili  0.5  /  DBili  x   /  AST  17  /  ALT  22  /  AlkPhos  93  01-13    LIVER FUNCTIONS - ( 13 Jan 2024 16:20 )  Alb: 3.1 g/dL / Pro: 6.8 gm/dL / ALK PHOS: 93 U/L / ALT: 22 U/L / AST: 17 U/L / GGT: x           PT/INR - ( 13 Jan 2024 16:20 )   PT: 11.1 sec;   INR: 0.92 ratio         PTT - ( 13 Jan 2024 16:20 )  PTT:28.6 sec  Urinalysis Basic - ( 13 Jan 2024 16:20 )    Color: Yellow / Appearance: Clear / SG: >1.030 / pH: x  Gluc: 532 mg/dL / Ketone: Negative mg/dL  / Bili: Negative / Urobili: 0.2 mg/dL   Blood: x / Protein: 100 mg/dL / Nitrite: Negative   Leuk Esterase: Negative / RBC: 3 /HPF / WBC 5 /HPF   Sq Epi: x / Non Sq Epi: x / Bacteria: x          < from: CT Brain Stroke Protocol (01.13.24 @ 15:42) >  IMPRESSION:  HEAD CT: Mild volume loss, microvascular disease, no midline shift.  Tiny focus of hyperattenuation in the left caudate head nucleus.   Suspected cavernoma. Cannot exclude tiny hemorrhage. MRI with gadolinium   may be helpful for further evaluation, if clinically indicated.  Encephalomalacia and gliosis in the right occipital lobe.    < end of copied text >  < from: CT Head No Cont (01.13.24 @ 21:04) >  IMPRESSION:    Small hyperdense focus in the left caudate nucleus head remains stable.   It may be further assessed and characterized with contrast-enhanced MRI   imaging, if clinically indicated.    < end of copied text >   Neurology Consult    Reason for Consult: Patient is a 58y old  Female who presents with a chief complaint of Suspected CVA (14 Jan 2024 14:12)      HPI:  Tiffanie Burrell is a 58 year old female with PMHx of HTN, HLD, IDDM2, and hx of TIA who presented to the ED on 1/13/24 for complaints of right upper extremity weakness.    History is very limited as patient is a poor historian. Patient reports she was not feeling well this morning while at [facility] around 10-11 AM. [place] disoriented and started having RUE weakness. Her friend at [facility] called EMS. 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 Given a bolus of fluids but not admitted. States she ran out of insulin 4 days ago and there were no refills for her insulin at her pharmacy.     In the ED, VSS except HR as elevated as 95 and BP as elevated as 192/128. Labs grossly unremarkable except blood glucose 532 and troponin 91.7. U/A with proteinuria and glucosuria. Initially was called as a code stroke given EMS report but given NIHSS 0, ER physician cancelled CTA H/N. [facility] stroke protocol with tiny focus of hyperattenuation in the left caudate head nucleus. Suspected cavernoma. Cannot exclude tiny hemorrhage. ER physician, Dr. Kee spoke with neurosurgery, Dr. Liza Gary who recommended repeat CT head in 6 hours and if stable, no indication for transfer but to start prophylactic keppra 500 mg x 7 days for seizure prevention. Repeat CT head with small hyperdense focus in the left caudate nucleus head remains stable. Received 1L NS bolus, insulin 5 U IV, acetaminophen 1 g IV, morphine 4 mg IV, and metoclopramide 10 mg IV. Back to baseline at the time of my examination. (13 Jan 2024 23:51)    Pt states she had a stroke more than 10 years ago at age 43 with residual R sided weakness and difficulty speaking.      PAST MEDICAL & SURGICAL HISTORY:  HTN (hypertension)      HLD (hyperlipidemia)      Insulin dependent type 2 diabetes mellitus      History of TIA (transient ischemic attack)          Allergies: Allergies    No Known Allergies    Intolerances        Social History: Denies toxic habits including tobacco, ETOH or illicit drugs.    Family History: FAMILY HISTORY:  . No family history of strokes    Medications: MEDICATIONS  (STANDING):  atorvastatin 80 milliGRAM(s) Oral at bedtime  dextrose 5%. 1000 milliLiter(s) (50 mL/Hr) IV Continuous <Continuous>  dextrose 5%. 1000 milliLiter(s) (100 mL/Hr) IV Continuous <Continuous>  dextrose 50% Injectable 25 Gram(s) IV Push once  dextrose 50% Injectable 12.5 Gram(s) IV Push once  dextrose 50% Injectable 25 Gram(s) IV Push once  gabapentin 100 milliGRAM(s) Oral daily  glucagon  Injectable 1 milliGRAM(s) IntraMuscular once  influenza   Vaccine 0.5 milliLiter(s) IntraMuscular once  insulin glargine Injectable (LANTUS) 52 Unit(s) SubCutaneous at bedtime  insulin lispro (ADMELOG) corrective regimen sliding scale   SubCutaneous three times a day before meals  insulin lispro (ADMELOG) corrective regimen sliding scale   SubCutaneous at bedtime  insulin lispro Injectable (ADMELOG) 10 Unit(s) SubCutaneous three times a day before meals  levETIRAcetam 500 milliGRAM(s) Oral two times a day    MEDICATIONS  (PRN):  acetaminophen     Tablet .. 650 milliGRAM(s) Oral every 6 hours PRN Temp greater or equal to 38C (100.4F), Mild Pain (1 - 3)  aluminum hydroxide/magnesium hydroxide/simethicone Suspension 30 milliLiter(s) Oral every 4 hours PRN Dyspepsia  dextrose Oral Gel 15 Gram(s) Oral once PRN Blood Glucose LESS THAN 70 milliGRAM(s)/deciliter  melatonin 3 milliGRAM(s) Oral at bedtime PRN Insomnia  ondansetron Injectable 4 milliGRAM(s) IV Push every 8 hours PRN Nausea and/or Vomiting      Review of Systems:  CONSTITUTIONAL:  No weight loss, fever, chills, weakness or fatigue.  HEENT:  Eyes:  No visual loss, blurred vision, double vision or yellow sclera. Ears, Nose, Throat:  No hearing loss, sneezing, congestion, runny nose or sore throat.  SKIN:  No rash or itching.  CARDIOVASCULAR:  No chest pain, chest pressure or chest discomfort. No palpitations or edema.  RESPIRATORY:  No shortness of breath, cough or sputum.  GASTROINTESTINAL:  No anorexia, nausea, vomiting or diarrhea. No abdominal pain or blood.  GENITOURINARY:  No burning on urination or incontinence   NEUROLOGICAL:  + weakness  MUSCULOSKELETAL:  No muscle, back pain, joint pain or stiffness.  HEMATOLOGIC:  No anemia, bleeding or bruising.  LYMPHATICS:  No enlarged nodes. No history of splenectomy.  PSYCHIATRIC:  No history of depression or anxiety.  ENDOCRINOLOGIC:  No reports of sweating, cold or heat intolerance. No polyuria or polydipsia.      Vitals:  Vital Signs Last 24 Hrs  T(C): 36.8 (15 Dexter 2024 05:00), Max: 37.3 (14 Jan 2024 16:03)  T(F): 98.2 (15 Dexter 2024 05:00), Max: 99.1 (14 Jan 2024 16:03)  HR: 83 (15 Dexter 2024 05:00) (76 - 92)  BP: 176/94 (15 Dexter 2024 05:00) (148/78 - 176/94)  BP(mean): --  RR: 18 (15 Dexter 2024 05:00) (18 - 18)  SpO2: 96% (15 Dexter 2024 05:00) (92% - 96%)    Parameters below as of 15 Dexter 2024 05:00  Patient On (Oxygen Delivery Method): room air        General Exam:   General Appearance: Appropriately dressed and in no acute distress       Head: Normocephalic, atraumatic and no dysmorphic features  Ear, Nose, and Throat: Moist mucous membranes  CVS: S1S2+  Resp: No SOB, no wheeze or rhonchi  GI: soft NT/ND  Extremities: No edema or cyanosis  Skin: No bruises or rashes     Neurological Exam:  Mental Status: Awake, alert and oriented x 3.  Able to follow simple and complex verbal commands. Able to name and repeat. speech is dysfluent with occasional word finding difficulty.   Cranial Nerves: PERRL, EOMI, VFFC, sensation V1-V3 intact,  no obvious facial asymmetry, equal elevation of palate, scm/trap 5/5, tongue is midline on protrusion.  hearing is grossly intact.   Motor: RUE and RLE drift - RLE from arthritic pain   Sensation: Intact to light touch and pinprick throughout. no right/left confusion. no extinction to tactile on DSS.   Reflexes: 1+ throughout at biceps, brachioradialis, triceps, patellars and ankles bilaterally and equal. No clonus. R toe and L toe were both downgoing.  Coordination: No dysmetria on FNF  Gait:  deferred    Data/Labs/Imaging which I personally reviewed.     Labs:     CBC Full  -  ( 13 Jan 2024 16:20 )  WBC Count : 6.47 K/uL  RBC Count : 5.27 M/uL  Hemoglobin : 15.1 g/dL  Hematocrit : 45.6 %  Platelet Count - Automated : 227 K/uL  Mean Cell Volume : 86.5 fl  Mean Cell Hemoglobin : 28.7 pg  Mean Cell Hemoglobin Concentration : 33.1 g/dL  Auto Neutrophil # : 4.10 K/uL  Auto Lymphocyte # : 1.86 K/uL  Auto Monocyte # : 0.44 K/uL  Auto Eosinophil # : 0.03 K/uL  Auto Basophil # : 0.03 K/uL  Auto Neutrophil % : 63.3 %  Auto Lymphocyte % : 28.7 %  Auto Monocyte % : 6.8 %  Auto Eosinophil % : 0.5 %  Auto Basophil % : 0.5 %    01-13    137  |  101  |  21  ----------------------------<  532<HH>  4.2   |  29  |  1.14    Ca    8.9      13 Jan 2024 16:20    TPro  6.8  /  Alb  3.1<L>  /  TBili  0.5  /  DBili  x   /  AST  17  /  ALT  22  /  AlkPhos  93  01-13    LIVER FUNCTIONS - ( 13 Jan 2024 16:20 )  Alb: 3.1 g/dL / Pro: 6.8 gm/dL / ALK PHOS: 93 U/L / ALT: 22 U/L / AST: 17 U/L / GGT: x           PT/INR - ( 13 Jan 2024 16:20 )   PT: 11.1 sec;   INR: 0.92 ratio         PTT - ( 13 Jan 2024 16:20 )  PTT:28.6 sec  Urinalysis Basic - ( 13 Jan 2024 16:20 )    Color: Yellow / Appearance: Clear / SG: >1.030 / pH: x  Gluc: 532 mg/dL / Ketone: Negative mg/dL  / Bili: Negative / Urobili: 0.2 mg/dL   Blood: x / Protein: 100 mg/dL / Nitrite: Negative   Leuk Esterase: Negative / RBC: 3 /HPF / WBC 5 /HPF   Sq Epi: x / Non Sq Epi: x / Bacteria: x          < from: CT Brain Stroke Protocol (01.13.24 @ 15:42) >  IMPRESSION:  HEAD CT: Mild volume loss, microvascular disease, no midline shift.  Tiny focus of hyperattenuation in the left caudate head nucleus.   Suspected cavernoma. Cannot exclude tiny hemorrhage. MRI with gadolinium   may be helpful for further evaluation, if clinically indicated.  Encephalomalacia and gliosis in the right occipital lobe.    < end of copied text >  < from: CT Head No Cont (01.13.24 @ 21:04) >  IMPRESSION:    Small hyperdense focus in the left caudate nucleus head remains stable.   It may be further assessed and characterized with contrast-enhanced MRI   imaging, if clinically indicated.    < end of copied text >   Neurology Consult    Reason for Consult: Patient is a 58y old  Female who presents with a chief complaint of Suspected CVA (14 Jan 2024 14:12)      HPI:  Tiffanie Burrell is a 58 year old female with PMHx of HTN, HLD, IDDM2, and hx of TIA who presented to the ED on 1/13/24 for complaints of right upper extremity weakness.    History is very limited as patient is a poor historian. Patient reports she was not feeling well this morning while at [facility] around 10-11 AM. [place] disoriented and started having RUE weakness. Her friend at [facility] called EMS. 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 Given a bolus of fluids but not admitted. States she ran out of insulin 4 days ago and there were no refills for her insulin at her pharmacy.     In the ED, VSS except HR as elevated as 95 and BP as elevated as 192/128. Labs grossly unremarkable except blood glucose 532 and troponin 91.7. U/A with proteinuria and glucosuria. Initially was called as a code stroke given EMS report but given NIHSS 0, ER physician [facility] with tiny focus of hyperattenuation in the left caudate head nucleus. Suspected cavernoma. Cannot exclude tiny hemorrhage. ER physician, Dr. Kee spoke with neurosurgery, Dr. Liza Gary who recommended repeat CT head in 6 hours and if stable, no indication for transfer but to start prophylactic keppra 500 mg x 7 days for seizure prevention. Repeat CT head with small hyperdense focus in the left caudate nucleus head remains stable. Received 1L NS bolus, insulin 5 U IV, acetaminophen 1 g IV, morphine 4 mg IV, and metoclopramide 10 mg IV. Back to baseline at the time of my examination. (13 Jan 2024 23:51)    Pt states she had a stroke more than 10 years ago at age 43 with residual R sided weakness and difficulty speaking.      PAST MEDICAL & SURGICAL HISTORY:  HTN (hypertension)      HLD (hyperlipidemia)      Insulin dependent type 2 diabetes mellitus      History of TIA (transient ischemic attack)          Allergies: Allergies    No Known Allergies    Intolerances        Social History: Denies toxic habits including tobacco, ETOH or illicit drugs.    Family History: FAMILY HISTORY:  . No family history of strokes    Medications: MEDICATIONS  (STANDING):  atorvastatin 80 milliGRAM(s) Oral at bedtime  dextrose 5%. 1000 milliLiter(s) (50 mL/Hr) IV Continuous <Continuous>  dextrose 5%. 1000 milliLiter(s) (100 mL/Hr) IV Continuous <Continuous>  dextrose 50% Injectable 25 Gram(s) IV Push once  dextrose 50% Injectable 12.5 Gram(s) IV Push once  dextrose 50% Injectable 25 Gram(s) IV Push once  gabapentin 100 milliGRAM(s) Oral daily  glucagon  Injectable 1 milliGRAM(s) IntraMuscular once  influenza   Vaccine 0.5 milliLiter(s) IntraMuscular once  insulin glargine Injectable (LANTUS) 52 Unit(s) SubCutaneous at bedtime  insulin lispro (ADMELOG) corrective regimen sliding scale   SubCutaneous three times a day before meals  insulin lispro (ADMELOG) corrective regimen sliding scale   SubCutaneous at bedtime  insulin lispro Injectable (ADMELOG) 10 Unit(s) SubCutaneous three times a day before meals  levETIRAcetam 500 milliGRAM(s) Oral two times a day    MEDICATIONS  (PRN):  acetaminophen     Tablet .. 650 milliGRAM(s) Oral every 6 hours PRN Temp greater or equal to 38C (100.4F), Mild Pain (1 - 3)  aluminum hydroxide/magnesium hydroxide/simethicone Suspension 30 milliLiter(s) Oral every 4 hours PRN Dyspepsia  dextrose Oral Gel 15 Gram(s) Oral once PRN Blood Glucose LESS THAN 70 milliGRAM(s)/deciliter  melatonin 3 milliGRAM(s) Oral at bedtime PRN Insomnia  ondansetron Injectable 4 milliGRAM(s) IV Push every 8 hours PRN Nausea and/or Vomiting      Review of Systems:  CONSTITUTIONAL:  No weight loss, fever, chills, weakness or fatigue.  HEENT:  Eyes:  No visual loss, blurred vision, double vision or yellow sclera. Ears, Nose, Throat:  No hearing loss, sneezing, congestion, runny nose or sore throat.  SKIN:  No rash or itching.  CARDIOVASCULAR:  No chest pain, chest pressure or chest discomfort. No palpitations or edema.  RESPIRATORY:  No shortness of breath, cough or sputum.  GASTROINTESTINAL:  No anorexia, nausea, vomiting or diarrhea. No abdominal pain or blood.  GENITOURINARY:  No burning on urination or incontinence   NEUROLOGICAL:  + weakness  MUSCULOSKELETAL:  No muscle, back pain, joint pain or stiffness.  HEMATOLOGIC:  No anemia, bleeding or bruising.  LYMPHATICS:  No enlarged nodes. No history of splenectomy.  PSYCHIATRIC:  No history of depression or anxiety.  ENDOCRINOLOGIC:  No reports of sweating, cold or heat intolerance. No polyuria or polydipsia.      Vitals:  Vital Signs Last 24 Hrs  T(C): 36.8 (15 Dexter 2024 05:00), Max: 37.3 (14 Jan 2024 16:03)  T(F): 98.2 (15 Dexter 2024 05:00), Max: 99.1 (14 Jan 2024 16:03)  HR: 83 (15 Dexter 2024 05:00) (76 - 92)  BP: 176/94 (15 Dexter 2024 05:00) (148/78 - 176/94)  BP(mean): --  RR: 18 (15 Dexter 2024 05:00) (18 - 18)  SpO2: 96% (15 Dexter 2024 05:00) (92% - 96%)    Parameters below as of 15 Dexter 2024 05:00  Patient On (Oxygen Delivery Method): room air        General Exam:   General Appearance: Appropriately dressed and in no acute distress       Head: Normocephalic, atraumatic and no dysmorphic features  Ear, Nose, and Throat: Moist mucous membranes  CVS: S1S2+  Resp: No SOB, no wheeze or rhonchi  GI: soft NT/ND  Extremities: No edema or cyanosis  Skin: No bruises or rashes     Neurological Exam:  Mental Status: Awake, alert and oriented x 3.  Able to follow simple and complex verbal commands. Able to name and repeat. speech is dysfluent with occasional word finding difficulty.   Cranial Nerves: PERRL, EOMI,  ? L sup quadrantopia, sensation V1-V3 intact,  no obvious facial asymmetry, equal elevation of palate, scm/trap 5/5, tongue is midline on protrusion.  hearing is grossly intact.   Motor: RUE and RLE drift - RLE from arthritic pain   Sensation: Intact to light touch and pinprick throughout. no right/left confusion. no extinction to tactile on DSS.   Reflexes: 1+ throughout at biceps, brachioradialis, triceps, patellars and ankles bilaterally and equal. No clonus. R toe and L toe were both downgoing.  Coordination: No dysmetria on FNF  Gait:  deferred    Data/Labs/Imaging which I personally reviewed.     Labs:     CBC Full  -  ( 13 Jan 2024 16:20 )  WBC Count : 6.47 K/uL  RBC Count : 5.27 M/uL  Hemoglobin : 15.1 g/dL  Hematocrit : 45.6 %  Platelet Count - Automated : 227 K/uL  Mean Cell Volume : 86.5 fl  Mean Cell Hemoglobin : 28.7 pg  Mean Cell Hemoglobin Concentration : 33.1 g/dL  Auto Neutrophil # : 4.10 K/uL  Auto Lymphocyte # : 1.86 K/uL  Auto Monocyte # : 0.44 K/uL  Auto Eosinophil # : 0.03 K/uL  Auto Basophil # : 0.03 K/uL  Auto Neutrophil % : 63.3 %  Auto Lymphocyte % : 28.7 %  Auto Monocyte % : 6.8 %  Auto Eosinophil % : 0.5 %  Auto Basophil % : 0.5 %    01-13    137  |  101  |  21  ----------------------------<  532<HH>  4.2   |  29  |  1.14    Ca    8.9      13 Jan 2024 16:20    TPro  6.8  /  Alb  3.1<L>  /  TBili  0.5  /  DBili  x   /  AST  17  /  ALT  22  /  AlkPhos  93  01-13    LIVER FUNCTIONS - ( 13 Jan 2024 16:20 )  Alb: 3.1 g/dL / Pro: 6.8 gm/dL / ALK PHOS: 93 U/L / ALT: 22 U/L / AST: 17 U/L / GGT: x           PT/INR - ( 13 Jan 2024 16:20 )   PT: 11.1 sec;   INR: 0.92 ratio         PTT - ( 13 Jan 2024 16:20 )  PTT:28.6 sec  Urinalysis Basic - ( 13 Jan 2024 16:20 )    Color: Yellow / Appearance: Clear / SG: >1.030 / pH: x  Gluc: 532 mg/dL / Ketone: Negative mg/dL  / Bili: Negative / Urobili: 0.2 mg/dL   Blood: x / Protein: 100 mg/dL / Nitrite: Negative   Leuk Esterase: Negative / RBC: 3 /HPF / WBC 5 /HPF   Sq Epi: x / Non Sq Epi: x / Bacteria: x          < from: CT Brain Stroke Protocol (01.13.24 @ 15:42) >  IMPRESSION:  HEAD CT: Mild volume loss, microvascular disease, no midline shift.  Tiny focus of hyperattenuation in the left caudate head nucleus.   Suspected cavernoma. Cannot exclude tiny hemorrhage. MRI with gadolinium   may be helpful for further evaluation, if clinically indicated.  Encephalomalacia and gliosis in the right occipital lobe.    < end of copied text >  < from: CT Head No Cont (01.13.24 @ 21:04) >  IMPRESSION:    Small hyperdense focus in the left caudate nucleus head remains stable.   It may be further assessed and characterized with contrast-enhanced MRI   imaging, if clinically indicated.    < end of copied text >   Neurology Consult    Reason for Consult: Patient is a 58y old  Female who presents with a chief complaint of Suspected CVA (14 Jan 2024 14:12)      HPI:  Tiffanie Burrell is a 58 year old female with PMHx of HTN, HLD, IDDM2, and hx of TIA who presented to the ED on 1/13/24 for complaints of right upper extremity weakness.    History is very limited as patient is a poor historian. Patient reports she was not feeling well this morning while at [facility] around 10-11 AM. [place] disoriented and started having RUE weakness. Her friend at [facility] called EMS. 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 Given a bolus of fluids but not admitted. States she ran out of insulin 4 days ago and there were no refills for her insulin at her pharmacy.     In the ED, VSS except HR as elevated as 95 and BP as elevated as 192/128. Labs grossly unremarkable except blood glucose 532 and troponin 91.7. U/A with proteinuria and glucosuria. Initially was called as a code stroke given EMS report but given NIHSS 0, ER physician cancelled CTA H/N. [facility] stroke protocol with tiny focus of hyperattenuation in the left caudate head nucleus. Suspected cavernoma. Cannot exclude tiny hemorrhage. ER physician, Dr. Kee spoke with neurosurgery, Dr. Liza Gary who recommended repeat CT head in 6 hours and if stable, no indication for transfer but to start prophylactic keppra 500 mg x 7 days for seizure prevention. Repeat CT head with small hyperdense focus in the left caudate nucleus head remains stable. Received 1L NS bolus, insulin 5 U IV, acetaminophen 1 g IV, morphine 4 mg IV, and metoclopramide 10 mg IV. Back to baseline at the time of my examination. (13 Jan 2024 23:51)    Pt states she had a stroke more than 10 years ago at age 43 with residual R sided weakness and difficulty speaking.      PAST MEDICAL & SURGICAL HISTORY:  HTN (hypertension)      HLD (hyperlipidemia)      Insulin dependent type 2 diabetes mellitus      History of TIA (transient ischemic attack)          Allergies: Allergies    No Known Allergies    Intolerances        Social History: Denies toxic habits including tobacco, ETOH or illicit drugs.    Family History: FAMILY HISTORY:  . No family history of strokes    Medications: MEDICATIONS  (STANDING):  atorvastatin 80 milliGRAM(s) Oral at bedtime  dextrose 5%. 1000 milliLiter(s) (50 mL/Hr) IV Continuous <Continuous>  dextrose 5%. 1000 milliLiter(s) (100 mL/Hr) IV Continuous <Continuous>  dextrose 50% Injectable 25 Gram(s) IV Push once  dextrose 50% Injectable 12.5 Gram(s) IV Push once  dextrose 50% Injectable 25 Gram(s) IV Push once  gabapentin 100 milliGRAM(s) Oral daily  glucagon  Injectable 1 milliGRAM(s) IntraMuscular once  influenza   Vaccine 0.5 milliLiter(s) IntraMuscular once  insulin glargine Injectable (LANTUS) 52 Unit(s) SubCutaneous at bedtime  insulin lispro (ADMELOG) corrective regimen sliding scale   SubCutaneous three times a day before meals  insulin lispro (ADMELOG) corrective regimen sliding scale   SubCutaneous at bedtime  insulin lispro Injectable (ADMELOG) 10 Unit(s) SubCutaneous three times a day before meals  levETIRAcetam 500 milliGRAM(s) Oral two times a day    MEDICATIONS  (PRN):  acetaminophen     Tablet .. 650 milliGRAM(s) Oral every 6 hours PRN Temp greater or equal to 38C (100.4F), Mild Pain (1 - 3)  aluminum hydroxide/magnesium hydroxide/simethicone Suspension 30 milliLiter(s) Oral every 4 hours PRN Dyspepsia  dextrose Oral Gel 15 Gram(s) Oral once PRN Blood Glucose LESS THAN 70 milliGRAM(s)/deciliter  melatonin 3 milliGRAM(s) Oral at bedtime PRN Insomnia  ondansetron Injectable 4 milliGRAM(s) IV Push every 8 hours PRN Nausea and/or Vomiting      Review of Systems:  CONSTITUTIONAL:  No weight loss, fever, chills, weakness or fatigue.  HEENT:  Eyes:  No visual loss, blurred vision, double vision or yellow sclera. Ears, Nose, Throat:  No hearing loss, sneezing, congestion, runny nose or sore throat.  SKIN:  No rash or itching.  CARDIOVASCULAR:  No chest pain, chest pressure or chest discomfort. No palpitations or edema.  RESPIRATORY:  No shortness of breath, cough or sputum.  GASTROINTESTINAL:  No anorexia, nausea, vomiting or diarrhea. No abdominal pain or blood.  GENITOURINARY:  No burning on urination or incontinence   NEUROLOGICAL:  + weakness  MUSCULOSKELETAL:  No muscle, back pain, joint pain or stiffness.  HEMATOLOGIC:  No anemia, bleeding or bruising.  LYMPHATICS:  No enlarged nodes. No history of splenectomy.  PSYCHIATRIC:  No history of depression or anxiety.  ENDOCRINOLOGIC:  No reports of sweating, cold or heat intolerance. No polyuria or polydipsia.      Vitals:  Vital Signs Last 24 Hrs  T(C): 36.8 (15 Dexter 2024 05:00), Max: 37.3 (14 Jan 2024 16:03)  T(F): 98.2 (15 Dexter 2024 05:00), Max: 99.1 (14 Jan 2024 16:03)  HR: 83 (15 Dexter 2024 05:00) (76 - 92)  BP: 176/94 (15 Dexter 2024 05:00) (148/78 - 176/94)  BP(mean): --  RR: 18 (15 Dexter 2024 05:00) (18 - 18)  SpO2: 96% (15 Dexter 2024 05:00) (92% - 96%)    Parameters below as of 15 Dexter 2024 05:00  Patient On (Oxygen Delivery Method): room air        General Exam:   General Appearance: Appropriately dressed and in no acute distress       Head: Normocephalic, atraumatic and no dysmorphic features  Ear, Nose, and Throat: Moist mucous membranes  CVS: S1S2+  Resp: No SOB, no wheeze or rhonchi  GI: soft NT/ND  Extremities: No edema or cyanosis  Skin: No bruises or rashes     Neurological Exam:  Mental Status: Awake, alert and oriented x 3.  Able to follow simple and complex verbal commands. Able to name and repeat. speech is dysfluent with occasional word finding difficulty.   Cranial Nerves: PERRL, EOMI,  ? L sup quadrantopia, sensation V1-V3 intact,  no obvious facial asymmetry, equal elevation of palate, scm/trap 5/5, tongue is midline on protrusion.  hearing is grossly intact.   Motor: RUE and RLE drift - RLE from arthritic pain   Sensation: Intact to light touch and pinprick throughout. no right/left confusion. no extinction to tactile on DSS.   Reflexes: 1+ throughout at biceps, brachioradialis, triceps, patellars and ankles bilaterally and equal. No clonus. R toe and L toe were both downgoing.  Coordination: No dysmetria on FNF  Gait:  deferred    Data/Labs/Imaging which I personally reviewed.     Labs:     CBC Full  -  ( 13 Jan 2024 16:20 )  WBC Count : 6.47 K/uL  RBC Count : 5.27 M/uL  Hemoglobin : 15.1 g/dL  Hematocrit : 45.6 %  Platelet Count - Automated : 227 K/uL  Mean Cell Volume : 86.5 fl  Mean Cell Hemoglobin : 28.7 pg  Mean Cell Hemoglobin Concentration : 33.1 g/dL  Auto Neutrophil # : 4.10 K/uL  Auto Lymphocyte # : 1.86 K/uL  Auto Monocyte # : 0.44 K/uL  Auto Eosinophil # : 0.03 K/uL  Auto Basophil # : 0.03 K/uL  Auto Neutrophil % : 63.3 %  Auto Lymphocyte % : 28.7 %  Auto Monocyte % : 6.8 %  Auto Eosinophil % : 0.5 %  Auto Basophil % : 0.5 %    01-13    137  |  101  |  21  ----------------------------<  532<HH>  4.2   |  29  |  1.14    Ca    8.9      13 Jan 2024 16:20    TPro  6.8  /  Alb  3.1<L>  /  TBili  0.5  /  DBili  x   /  AST  17  /  ALT  22  /  AlkPhos  93  01-13    LIVER FUNCTIONS - ( 13 Jan 2024 16:20 )  Alb: 3.1 g/dL / Pro: 6.8 gm/dL / ALK PHOS: 93 U/L / ALT: 22 U/L / AST: 17 U/L / GGT: x           PT/INR - ( 13 Jan 2024 16:20 )   PT: 11.1 sec;   INR: 0.92 ratio         PTT - ( 13 Jan 2024 16:20 )  PTT:28.6 sec  Urinalysis Basic - ( 13 Jan 2024 16:20 )    Color: Yellow / Appearance: Clear / SG: >1.030 / pH: x  Gluc: 532 mg/dL / Ketone: Negative mg/dL  / Bili: Negative / Urobili: 0.2 mg/dL   Blood: x / Protein: 100 mg/dL / Nitrite: Negative   Leuk Esterase: Negative / RBC: 3 /HPF / WBC 5 /HPF   Sq Epi: x / Non Sq Epi: x / Bacteria: x          < from: CT Brain Stroke Protocol (01.13.24 @ 15:42) >  IMPRESSION:  HEAD CT: Mild volume loss, microvascular disease, no midline shift.  Tiny focus of hyperattenuation in the left caudate head nucleus.   Suspected cavernoma. Cannot exclude tiny hemorrhage. MRI with gadolinium   may be helpful for further evaluation, if clinically indicated.  Encephalomalacia and gliosis in the right occipital lobe.    < end of copied text >  < from: CT Head No Cont (01.13.24 @ 21:04) >  IMPRESSION:    Small hyperdense focus in the left caudate nucleus head remains stable.   It may be further assessed and characterized with contrast-enhanced MRI   imaging, if clinically indicated.    < end of copied text >   Neurology Consult    Reason for Consult: Patient is a 58y old  Female who presents with a chief complaint of Suspected CVA (14 Jan 2024 14:12)      HPI:  Tiffanie Burrell is a 58 year old female with PMHx of HTN, HLD, IDDM2, and hx of TIA who presented to the ED on 1/13/24 for complaints of right upper extremity weakness.    History is very limited as patient is a poor historian. Patient reports she was not feeling well this morning while at [facility] around 10-11 AM. [place] disoriented and started having RUE weakness. Her friend at [facility] called EMS. 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 Given a bolus of fluids but not admitted. States she ran out of insulin 4 days ago and there were no refills for her insulin at her pharmacy.     In the ED, VSS except HR as elevated as 95 and BP as elevated as 192/128. Labs grossly unremarkable except blood glucose 532 and troponin 91.7. U/A with proteinuria and glucosuria. Initially was called as a code stroke given EMS report but given NIHSS 0, ER physician cancelled CTA H/N. [facility] stroke protocol with tiny focus of hyperattenuation in the left caudate head nucleus. Suspected cavernoma. Cannot exclude tiny hemorrhage. ER physician, Dr. Kee spoke with neurosurgery, Dr. Liza Gary who recommended repeat CT head in 6 hours and if stable, no indication for transfer but to start prophylactic keppra 500 mg x 7 days for seizure prevention. Repeat CT head with small hyperdense focus in the left caudate nucleus head remains stable. Received 1L NS bolus, insulin 5 U IV, acetaminophen 1 g IV, morphine 4 mg IV, and metoclopramide 10 mg IV. Back to baseline at the time of my examination. (13 Jan 2024 23:51)    Pt states she had a stroke more than 10 years ago at age 43 with residual R sided weakness and difficulty speaking.      PAST MEDICAL & SURGICAL HISTORY:  HTN (hypertension)      HLD (hyperlipidemia)      Insulin dependent type 2 diabetes mellitus      History of TIA (transient ischemic attack)          Allergies: Allergies    No Known Allergies    Intolerances        Social History: Denies toxic habits including tobacco, ETOH or illicit drugs.    Family History: FAMILY HISTORY:  . No family history of strokes    Medications: MEDICATIONS  (STANDING):  atorvastatin 80 milliGRAM(s) Oral at bedtime  dextrose 5%. 1000 milliLiter(s) (50 mL/Hr) IV Continuous <Continuous>  dextrose 5%. 1000 milliLiter(s) (100 mL/Hr) IV Continuous <Continuous>  dextrose 50% Injectable 25 Gram(s) IV Push once  dextrose 50% Injectable 12.5 Gram(s) IV Push once  dextrose 50% Injectable 25 Gram(s) IV Push once  gabapentin 100 milliGRAM(s) Oral daily  glucagon  Injectable 1 milliGRAM(s) IntraMuscular once  influenza   Vaccine 0.5 milliLiter(s) IntraMuscular once  insulin glargine Injectable (LANTUS) 52 Unit(s) SubCutaneous at bedtime  insulin lispro (ADMELOG) corrective regimen sliding scale   SubCutaneous three times a day before meals  insulin lispro (ADMELOG) corrective regimen sliding scale   SubCutaneous at bedtime  insulin lispro Injectable (ADMELOG) 10 Unit(s) SubCutaneous three times a day before meals  levETIRAcetam 500 milliGRAM(s) Oral two times a day    MEDICATIONS  (PRN):  acetaminophen     Tablet .. 650 milliGRAM(s) Oral every 6 hours PRN Temp greater or equal to 38C (100.4F), Mild Pain (1 - 3)  aluminum hydroxide/magnesium hydroxide/simethicone Suspension 30 milliLiter(s) Oral every 4 hours PRN Dyspepsia  dextrose Oral Gel 15 Gram(s) Oral once PRN Blood Glucose LESS THAN 70 milliGRAM(s)/deciliter  melatonin 3 milliGRAM(s) Oral at bedtime PRN Insomnia  ondansetron Injectable 4 milliGRAM(s) IV Push every 8 hours PRN Nausea and/or Vomiting      Review of Systems:  CONSTITUTIONAL:  No weight loss, fever, chills, weakness or fatigue.  HEENT:  Eyes:  No visual loss, blurred vision, double vision or yellow sclera. Ears, Nose, Throat:  No hearing loss, sneezing, congestion, runny nose or sore throat.  SKIN:  No rash or itching.  CARDIOVASCULAR:  No chest pain, chest pressure or chest discomfort. No palpitations or edema.  RESPIRATORY:  No shortness of breath, cough or sputum.  GASTROINTESTINAL:  No anorexia, nausea, vomiting or diarrhea. No abdominal pain or blood.  GENITOURINARY:  No burning on urination or incontinence   NEUROLOGICAL:  + weakness  MUSCULOSKELETAL:  No muscle, back pain, joint pain or stiffness.  HEMATOLOGIC:  No anemia, bleeding or bruising.  LYMPHATICS:  No enlarged nodes. No history of splenectomy.  PSYCHIATRIC:  No history of depression or anxiety.  ENDOCRINOLOGIC:  No reports of sweating, cold or heat intolerance. No polyuria or polydipsia.      Vitals:  Vital Signs Last 24 Hrs  T(C): 36.8 (15 Dexter 2024 05:00), Max: 37.3 (14 Jan 2024 16:03)  T(F): 98.2 (15 Dexter 2024 05:00), Max: 99.1 (14 Jan 2024 16:03)  HR: 83 (15 Dexter 2024 05:00) (76 - 92)  BP: 176/94 (15 Dexter 2024 05:00) (148/78 - 176/94)  BP(mean): --  RR: 18 (15 Dexter 2024 05:00) (18 - 18)  SpO2: 96% (15 Dexter 2024 05:00) (92% - 96%)    Parameters below as of 15 Dexter 2024 05:00  Patient On (Oxygen Delivery Method): room air        General Exam:   General Appearance: Appropriately dressed and in no acute distress       Head: Normocephalic, atraumatic and no dysmorphic features  Ear, Nose, and Throat: Moist mucous membranes  CVS: S1S2+  Resp: No SOB, no wheeze or rhonchi  GI: soft NT/ND  Extremities: No edema or cyanosis  Skin: No bruises or rashes     Neurological Exam:  Mental Status: Awake, alert and oriented x 3.  Able to follow simple and complex verbal commands. Able to name and repeat. speech is dysfluent with occasional word finding difficulty.   Cranial Nerves: PERRL, EOMI,  ? L sup quadrantopia, sensation V1-V3 intact,  no obvious facial asymmetry, equal elevation of palate, scm/trap 5/5, tongue is midline on protrusion.  hearing is grossly intact.   Motor: RUE and RLE drift - RLE from arthritic pain   Sensation: Intact to light touch and pinprick throughout. no right/left confusion. no extinction to tactile on DSS.   Reflexes: 1+ throughout at biceps, brachioradialis, triceps, patellars and ankles bilaterally and equal. No clonus. R toe and L toe were both downgoing.  Coordination: No dysmetria on FNF  Gait:  deferred    Data/Labs/Imaging which I personally reviewed.     Labs:     CBC Full  -  ( 13 Jan 2024 16:20 )  WBC Count : 6.47 K/uL  RBC Count : 5.27 M/uL  Hemoglobin : 15.1 g/dL  Hematocrit : 45.6 %  Platelet Count - Automated : 227 K/uL  Mean Cell Volume : 86.5 fl  Mean Cell Hemoglobin : 28.7 pg  Mean Cell Hemoglobin Concentration : 33.1 g/dL  Auto Neutrophil # : 4.10 K/uL  Auto Lymphocyte # : 1.86 K/uL  Auto Monocyte # : 0.44 K/uL  Auto Eosinophil # : 0.03 K/uL  Auto Basophil # : 0.03 K/uL  Auto Neutrophil % : 63.3 %  Auto Lymphocyte % : 28.7 %  Auto Monocyte % : 6.8 %  Auto Eosinophil % : 0.5 %  Auto Basophil % : 0.5 %    01-13    137  |  101  |  21  ----------------------------<  532<HH>  4.2   |  29  |  1.14    Ca    8.9      13 Jan 2024 16:20    TPro  6.8  /  Alb  3.1<L>  /  TBili  0.5  /  DBili  x   /  AST  17  /  ALT  22  /  AlkPhos  93  01-13    LIVER FUNCTIONS - ( 13 Jan 2024 16:20 )  Alb: 3.1 g/dL / Pro: 6.8 gm/dL / ALK PHOS: 93 U/L / ALT: 22 U/L / AST: 17 U/L / GGT: x           PT/INR - ( 13 Jan 2024 16:20 )   PT: 11.1 sec;   INR: 0.92 ratio         PTT - ( 13 Jan 2024 16:20 )  PTT:28.6 sec  Urinalysis Basic - ( 13 Jan 2024 16:20 )    Color: Yellow / Appearance: Clear / SG: >1.030 / pH: x  Gluc: 532 mg/dL / Ketone: Negative mg/dL  / Bili: Negative / Urobili: 0.2 mg/dL   Blood: x / Protein: 100 mg/dL / Nitrite: Negative   Leuk Esterase: Negative / RBC: 3 /HPF / WBC 5 /HPF   Sq Epi: x / Non Sq Epi: x / Bacteria: x          < from: CT Brain Stroke Protocol (01.13.24 @ 15:42) >  IMPRESSION:  HEAD CT: Mild volume loss, microvascular disease, no midline shift.  Tiny focus of hyperattenuation in the left caudate head nucleus.   Suspected cavernoma. Cannot exclude tiny hemorrhage. MRI with gadolinium   may be helpful for further evaluation, if clinically indicated.  Encephalomalacia and gliosis in the right occipital lobe.    < end of copied text >  < from: CT Head No Cont (01.13.24 @ 21:04) >  IMPRESSION:    Small hyperdense focus in the left caudate nucleus head remains stable.   It may be further assessed and characterized with contrast-enhanced MRI   imaging, if clinically indicated.    < end of copied text >

## 2024-01-15 NOTE — PROGRESS NOTE ADULT - SUBJECTIVE AND OBJECTIVE BOX
Patient is a 58y old  Female who presents with a chief complaint of Suspected CVA (15 Dexter 2024 09:00)      INTERVAL HPI/OVERNIGHT EVENTS: Overnight no acute events. Going down to EEG/MRI.    MEDICATIONS  (STANDING):  atorvastatin 80 milliGRAM(s) Oral at bedtime  dextrose 5%. 1000 milliLiter(s) (50 mL/Hr) IV Continuous <Continuous>  dextrose 5%. 1000 milliLiter(s) (100 mL/Hr) IV Continuous <Continuous>  dextrose 50% Injectable 25 Gram(s) IV Push once  dextrose 50% Injectable 25 Gram(s) IV Push once  dextrose 50% Injectable 12.5 Gram(s) IV Push once  gabapentin 100 milliGRAM(s) Oral daily  glucagon  Injectable 1 milliGRAM(s) IntraMuscular once  influenza   Vaccine 0.5 milliLiter(s) IntraMuscular once  insulin glargine Injectable (LANTUS) 52 Unit(s) SubCutaneous at bedtime  insulin lispro (ADMELOG) corrective regimen sliding scale   SubCutaneous three times a day before meals  insulin lispro (ADMELOG) corrective regimen sliding scale   SubCutaneous at bedtime  insulin lispro Injectable (ADMELOG) 10 Unit(s) SubCutaneous three times a day before meals  levETIRAcetam 500 milliGRAM(s) Oral two times a day    MEDICATIONS  (PRN):  acetaminophen     Tablet .. 650 milliGRAM(s) Oral every 6 hours PRN Temp greater or equal to 38C (100.4F), Mild Pain (1 - 3)  aluminum hydroxide/magnesium hydroxide/simethicone Suspension 30 milliLiter(s) Oral every 4 hours PRN Dyspepsia  dextrose Oral Gel 15 Gram(s) Oral once PRN Blood Glucose LESS THAN 70 milliGRAM(s)/deciliter  melatonin 3 milliGRAM(s) Oral at bedtime PRN Insomnia  ondansetron Injectable 4 milliGRAM(s) IV Push every 8 hours PRN Nausea and/or Vomiting      Allergies    No Known Allergies    Intolerances        REVIEW OF SYSTEMS:  ROS negative unless stated otherwise.    Vital Signs Last 24 Hrs  T(C): 36.6 (15 Dexter 2024 11:06), Max: 37.3 (14 Jan 2024 16:03)  T(F): 97.9 (15 Dexter 2024 11:06), Max: 99.1 (14 Jan 2024 16:03)  HR: 89 (15 Dexter 2024 11:06) (76 - 90)  BP: 181/74 (15 Dexter 2024 11:06) (152/87 - 181/74)  BP(mean): --  RR: 18 (15 Dexter 2024 11:06) (18 - 18)  SpO2: 95% (15 Dexter 2024 11:06) (92% - 96%)    Parameters below as of 15 Dexter 2024 05:00  Patient On (Oxygen Delivery Method): room air        PHYSICAL EXAM:  GENERAL: NAD, obese female   HEAD:  Atraumatic   EYES: EOMI   ENMT: Moist mucous membranes  NECK: Supple   NERVOUS SYSTEM:  Awake  CHEST/LUNG: CTAB   HEART: RRR   ABDOMEN: Soft, Nontender, Nondistended  EXTREMITIES:   No clubbing, cyanosis, or edema  PSYCH: Mood appropriate    LABS:                        14.5   5.44  )-----------( 222      ( 15 Dexter 2024 10:05 )             44.4     01-15    139  |  107  |  21  ----------------------------<  244<H>  4.0   |  31  |  0.93    Ca    9.0      15 Dexter 2024 10:05    TPro  6.8  /  Alb  3.1<L>  /  TBili  0.5  /  DBili  x   /  AST  17  /  ALT  22  /  AlkPhos  93  01-13    PT/INR - ( 13 Jan 2024 16:20 )   PT: 11.1 sec;   INR: 0.92 ratio         PTT - ( 13 Jan 2024 16:20 )  PTT:28.6 sec  Urinalysis Basic - ( 15 Dexter 2024 10:05 )    Color: x / Appearance: x / SG: x / pH: x  Gluc: 244 mg/dL / Ketone: x  / Bili: x / Urobili: x   Blood: x / Protein: x / Nitrite: x   Leuk Esterase: x / RBC: x / WBC x   Sq Epi: x / Non Sq Epi: x / Bacteria: x      CAPILLARY BLOOD GLUCOSE      POCT Blood Glucose.: 277 mg/dL (15 Dexter 2024 12:27)  POCT Blood Glucose.: 208 mg/dL (15 Dexter 2024 07:18)  POCT Blood Glucose.: 192 mg/dL (14 Jan 2024 21:09)  POCT Blood Glucose.: 363 mg/dL (14 Jan 2024 16:50)  POCT Blood Glucose.: 309 mg/dL (14 Jan 2024 14:14)      RADIOLOGY & ADDITIONAL TESTS:    Imaging Personally Reviewed:  [ X] YES  [ ] NO    Consultant(s) Notes Reviewed:  [ X] YES  [ ] NO    Care Discussed with Consultants/Other Providers [X ] YES  [ ] NO

## 2024-01-15 NOTE — EEG REPORT - NS EEG TEXT BOX
TIFFANYMUKUL [id number]     Study Date: 		01-15-24    --------------------------------------------------------------------------------------------------  History:  CC/ HPI Patient is a 58y old  Female who presents with a chief complaint of Suspected CVA (15 Dexter 2024 09:00)    MEDICATIONS  (STANDING):  atorvastatin 80 milliGRAM(s) Oral at bedtime  gabapentin 100 milliGRAM(s) Oral daily  glucagon  Injectable 1 milliGRAM(s) IntraMuscular once  influenza   Vaccine 0.5 milliLiter(s) IntraMuscular once  insulin glargine Injectable (LANTUS) 52 Unit(s) SubCutaneous at bedtime  insulin lispro (ADMELOG) corrective regimen sliding scale   SubCutaneous three times a day before meals  insulin lispro (ADMELOG) corrective regimen sliding scale   SubCutaneous at bedtime  insulin lispro Injectable (ADMELOG) 10 Unit(s) SubCutaneous three times a day before meals  levETIRAcetam 500 milliGRAM(s) Oral two times a day    --------------------------------------------------------------------------------------------------  Study Interpretation:    [Abbreviation Key:  PDR=alpha rhythm/posterior dominant rhythm. A-P=anterior posterior.  Amplitude: ‘very low’:<20; ‘low’:20-49; ‘medium’:; ‘high’:>150uV.  Persistence for periodic/rhythmic patterns (% of epoch) ‘rare’:<1%; ‘occasional’:1-10%; ‘frequent’:10-50%; ‘abundant’:50-90%; ‘continuous’:>90%.  Persistence for sporadic discharges: ‘rare’:<1/hr; ‘occasional’:1/min-1/hr; ‘frequent’:>1/min; ‘abundant’:>1/10 sec.  RPP=rhythmic and periodic patterns; GRDA=generalized rhythmic delta activity; FIRDA=frontal intermittent GRDA; LRDA=lateralized rhythmic delta activity; TIRDA=temporal intermittent rhythmic delta activity;  LPD=PLED=lateralized periodic discharges; GPD=generalized periodic discharges; BIPDs =bilateral independent periodic discharges; Mf=multifocal; SIRPDs=stimulus induced rhythmic, periodic, or ictal appearing discharges; BIRDs=brief potentially ictal rhythmic discharges >4 Hz, lasting .5-10s; PFA (paroxysmal bursts >13 Hz or =8 Hz <10s).  Modifiers: +F=with fast component; +S=with spike component; +R=with rhythmic component.  S-B=burst suppression pattern.  Max=maximal. N1-drowsy; N2-stage II sleep; N3-slow wave sleep. SSS/BETS=small sharp spikes/benign epileptiform transients of sleep. HV=hyperventilation; PS=photic stimulation]    FINDINGS:      Background:  Continuity: continuous  Symmetry: symmetric  PDR: 7.5 Hz activity, with amplitude to 40 uV, that attenuated to eye opening.    Reactivity: present  Voltage: normal (between 20-150uV)  Anterior Posterior Gradient: present  Other background findings: none  Breach: absent    Background Slowing:  Generalized slowing: diffuse irregular delta and theta activity.  Shifting  Focal slowing: none was present.    State Changes:   -Drowsiness noted with increased slowing, attenuation of fast activity, vertex transients.  -N2 sleep transients were not recorded.    Sporadic Epileptiform Discharges:    None    Rhythmic and Periodic Patterns (RPPs):  Brief GRDA 1-1.5hz in drowsiness    Electrographic and Electroclinical seizures:  None    Other Clinical Events:  None    Activation Procedures:   -Hyperventilation was not performed.    -Photic stimulation was performed and did not elicit any abnormalities.       Artifacts:  Intermittent myogenic and movement artifacts were noted.    ECG:  The heart rate on single channel ECG was predominantly between 66-72BPM.    EEG Classification / Summary:  Abnormal EEG study  Mild shifting generalized background slowing, GRDA    -----------------------------------------------------------------------------------------------------    Clinical Impression:  Mild diffuse/multi-focal cerebral dysfunction, not specific as to etiology.  There were no epileptiform abnormalities recorded.      -------------------------------------------------------------------------------------------------------  [facility] EEG Reading Room Ph#: [phone]  Epilepsy Answering Service after 5PM and before 8:30AM: Ph#: [phone]    Gilles Rodriguez M.D.   of Neurology, [facility]	   TIFFANYMUKUL [id number]     Study Date: 		01-15-24    --------------------------------------------------------------------------------------------------  History:  CC/ HPI Patient is a 58y old  Female who presents with a chief complaint of Suspected CVA (15 Dexter 2024 09:00)    MEDICATIONS  (STANDING):  atorvastatin 80 milliGRAM(s) Oral at bedtime  gabapentin 100 milliGRAM(s) Oral daily  glucagon  Injectable 1 milliGRAM(s) IntraMuscular once  influenza   Vaccine 0.5 milliLiter(s) IntraMuscular once  insulin glargine Injectable (LANTUS) 52 Unit(s) SubCutaneous at bedtime  insulin lispro (ADMELOG) corrective regimen sliding scale   SubCutaneous three times a day before meals  insulin lispro (ADMELOG) corrective regimen sliding scale   SubCutaneous at bedtime  insulin lispro Injectable (ADMELOG) 10 Unit(s) SubCutaneous three times a day before meals  levETIRAcetam 500 milliGRAM(s) Oral two times a day    --------------------------------------------------------------------------------------------------  Study Interpretation:    [Abbreviation Key:  PDR=alpha rhythm/posterior dominant rhythm. A-P=anterior posterior.  Amplitude: ‘very low’:<20; ‘low’:20-49; ‘medium’:; ‘high’:>150uV.  Persistence for periodic/rhythmic patterns (% of epoch) ‘rare’:<1%; ‘occasional’:1-10%; ‘frequent’:10-50%; ‘abundant’:50-90%; ‘continuous’:>90%.  Persistence for sporadic discharges: ‘rare’:<1/hr; ‘occasional’:1/min-1/hr; ‘frequent’:>1/min; ‘abundant’:>1/10 sec.  RPP=rhythmic and periodic patterns; GRDA=generalized rhythmic delta activity; FIRDA=frontal intermittent GRDA; LRDA=lateralized rhythmic delta activity; TIRDA=temporal intermittent rhythmic delta activity;  LPD=PLED=lateralized periodic discharges; GPD=generalized periodic discharges; BIPDs =bilateral independent periodic discharges; Mf=multifocal; SIRPDs=stimulus induced rhythmic, periodic, or ictal appearing discharges; BIRDs=brief potentially ictal rhythmic discharges >4 Hz, lasting .5-10s; PFA (paroxysmal bursts >13 Hz or =8 Hz <10s).  Modifiers: +F=with fast component; +S=with spike component; +R=with rhythmic component.  S-B=burst suppression pattern.  Max=maximal. N1-drowsy; N2-stage II sleep; N3-slow wave sleep. SSS/BETS=small sharp spikes/benign epileptiform transients of sleep. HV=hyperventilation; PS=photic stimulation]    FINDINGS:      Background:  Continuity: continuous  Symmetry: symmetric  PDR: 7.5 Hz activity, with amplitude to 40 uV, that attenuated to eye opening.    Reactivity: present  Voltage: normal (between 20-150uV)  Anterior Posterior Gradient: present  Other background findings: none  Breach: absent    Background Slowing:  Generalized slowing: diffuse irregular delta and theta activity.  Shifting  Focal slowing: none was present.    State Changes:   -Drowsiness noted with increased slowing, attenuation of fast activity, vertex transients.  -N2 sleep transients were not recorded.    Sporadic Epileptiform Discharges:    None    Rhythmic and Periodic Patterns (RPPs):  Brief GRDA 1-1.5hz in drowsiness    Electrographic and Electroclinical seizures:  None    Other Clinical Events:  None    Activation Procedures:   -Hyperventilation was not performed.    -Photic stimulation was performed and did not elicit any abnormalities.       Artifacts:  Intermittent myogenic and movement artifacts were noted.    ECG:  The heart rate on single channel ECG was predominantly between 66-72BPM.    EEG Classification / Summary:  Abnormal EEG study  Mild shifting generalized background slowing, GRDA    -----------------------------------------------------------------------------------------------------    Clinical Impression:  Mild diffuse/multi-focal cerebral dysfunction, not specific as to etiology.  There were no epileptiform abnormalities recorded.      -------------------------------------------------------------------------------------------------------  [facility] EEG Reading Room Ph#: [phone]  Epilepsy Answering Service after 5PM and before 8:30AM: Ph#: [phone]    Gilles Rodriguez M.D.   of Neurology, [facility]	   TIFFANYMUKUL [id number]     Study Date: 		01-15-24    --------------------------------------------------------------------------------------------------  History:  CC/ HPI Patient is a 58y old  Female who presents with a chief complaint of Suspected CVA (15 Dexter 2024 09:00)    MEDICATIONS  (STANDING):  atorvastatin 80 milliGRAM(s) Oral at bedtime  gabapentin 100 milliGRAM(s) Oral daily  glucagon  Injectable 1 milliGRAM(s) IntraMuscular once  influenza   Vaccine 0.5 milliLiter(s) IntraMuscular once  insulin glargine Injectable (LANTUS) 52 Unit(s) SubCutaneous at bedtime  insulin lispro (ADMELOG) corrective regimen sliding scale   SubCutaneous three times a day before meals  insulin lispro (ADMELOG) corrective regimen sliding scale   SubCutaneous at bedtime  insulin lispro Injectable (ADMELOG) 10 Unit(s) SubCutaneous three times a day before meals  levETIRAcetam 500 milliGRAM(s) Oral two times a day    --------------------------------------------------------------------------------------------------  Study Interpretation:    [Abbreviation Key:  PDR=alpha rhythm/posterior dominant rhythm. A-P=anterior posterior.  Amplitude: ‘very low’:<20; ‘low’:20-49; ‘medium’:; ‘high’:>150uV.  Persistence for periodic/rhythmic patterns (% of epoch) ‘rare’:<1%; ‘occasional’:1-10%; ‘frequent’:10-50%; ‘abundant’:50-90%; ‘continuous’:>90%.  Persistence for sporadic discharges: ‘rare’:<1/hr; ‘occasional’:1/min-1/hr; ‘frequent’:>1/min; ‘abundant’:>1/10 sec.  RPP=rhythmic and periodic patterns; GRDA=generalized rhythmic delta activity; FIRDA=frontal intermittent GRDA; LRDA=lateralized rhythmic delta activity; TIRDA=temporal intermittent rhythmic delta activity;  LPD=PLED=lateralized periodic discharges; GPD=generalized periodic discharges; BIPDs =bilateral independent periodic discharges; Mf=multifocal; SIRPDs=stimulus induced rhythmic, periodic, or ictal appearing discharges; BIRDs=brief potentially ictal rhythmic discharges >4 Hz, lasting .5-10s; PFA (paroxysmal bursts >13 Hz or =8 Hz <10s).  Modifiers: +F=with fast component; +S=with spike component; +R=with rhythmic component.  S-B=burst suppression pattern.  Max=maximal. N1-drowsy; N2-stage II sleep; N3-slow wave sleep. SSS/BETS=small sharp spikes/benign epileptiform transients of sleep. HV=hyperventilation; PS=photic stimulation]    FINDINGS:      Background:  Continuity: continuous  Symmetry: symmetric  PDR: 7.5 Hz activity, with amplitude to 40 uV, that attenuated to eye opening.    Reactivity: present  Voltage: normal (between 20-150uV)  Anterior Posterior Gradient: present  Other background findings: none  Breach: absent    Background Slowing:  Generalized slowing: diffuse irregular delta and theta activity.  Shifting  Focal slowing: none was present.    State Changes:   -Drowsiness noted with increased slowing, attenuation of fast activity, vertex transients.  -N2 sleep transients were not recorded.    Sporadic Epileptiform Discharges:    None    Rhythmic and Periodic Patterns (RPPs):  Brief GRDA 1-1.5hz in drowsiness    Electrographic and Electroclinical seizures:  None    Other Clinical Events:  None    Activation Procedures:   -Hyperventilation was not performed.    -Photic stimulation was performed and did not elicit any abnormalities.       Artifacts:  Intermittent myogenic and movement artifacts were noted.    ECG:  The heart rate on single channel ECG was predominantly between 66-72BPM.    EEG Classification / Summary:  Abnormal EEG study  Mild shifting generalized background slowing, GRDA    -----------------------------------------------------------------------------------------------------    Clinical Impression:  Mild diffuse/multi-focal cerebral dysfunction, not specific as to etiology.  There were no epileptiform abnormalities recorded.      -------------------------------------------------------------------------------------------------------  [facility] EEG Reading Room Ph#: [phone]  Epilepsy Answering Service after 5PM and before 8:30AM: Ph#: [phone]    Gilles Rodriguez M.D.   of Neurology, [facility]

## 2024-01-16 LAB
ANION GAP SERPL CALC-SCNC: 3 MMOL/L — LOW (ref 5–17)
BUN SERPL-MCNC: 21 MG/DL — SIGNIFICANT CHANGE UP (ref 7–23)
CALCIUM SERPL-MCNC: 8.7 MG/DL — SIGNIFICANT CHANGE UP (ref 8.5–10.1)
CHLORIDE SERPL-SCNC: 107 MMOL/L — SIGNIFICANT CHANGE UP (ref 96–108)
CO2 SERPL-SCNC: 29 MMOL/L — SIGNIFICANT CHANGE UP (ref 22–31)
CREAT SERPL-MCNC: 0.75 MG/DL — SIGNIFICANT CHANGE UP (ref 0.5–1.3)
EGFR: 91 ML/MIN/1.73M2 — SIGNIFICANT CHANGE UP
GLUCOSE BLDC GLUCOMTR-MCNC: 225 MG/DL — HIGH (ref 70–99)
GLUCOSE BLDC GLUCOMTR-MCNC: 226 MG/DL — HIGH (ref 70–99)
GLUCOSE BLDC GLUCOMTR-MCNC: 255 MG/DL — HIGH (ref 70–99)
GLUCOSE BLDC GLUCOMTR-MCNC: 277 MG/DL — HIGH (ref 70–99)
GLUCOSE BLDC GLUCOMTR-MCNC: 331 MG/DL — HIGH (ref 70–99)
GLUCOSE SERPL-MCNC: 267 MG/DL — HIGH (ref 70–99)
HCT VFR BLD CALC: 39.3 % — SIGNIFICANT CHANGE UP (ref 34.5–45)
HGB BLD-MCNC: 13.2 G/DL — SIGNIFICANT CHANGE UP (ref 11.5–15.5)
MCHC RBC-ENTMCNC: 28.7 PG — SIGNIFICANT CHANGE UP (ref 27–34)
MCHC RBC-ENTMCNC: 33.6 G/DL — SIGNIFICANT CHANGE UP (ref 32–36)
MCV RBC AUTO: 85.4 FL — SIGNIFICANT CHANGE UP (ref 80–100)
NRBC # BLD: 0 /100 WBCS — SIGNIFICANT CHANGE UP (ref 0–0)
PLATELET # BLD AUTO: 198 K/UL — SIGNIFICANT CHANGE UP (ref 150–400)
POTASSIUM SERPL-MCNC: 4 MMOL/L — SIGNIFICANT CHANGE UP (ref 3.5–5.3)
POTASSIUM SERPL-SCNC: 4 MMOL/L — SIGNIFICANT CHANGE UP (ref 3.5–5.3)
RBC # BLD: 4.6 M/UL — SIGNIFICANT CHANGE UP (ref 3.8–5.2)
RBC # FLD: 12.5 % — SIGNIFICANT CHANGE UP (ref 10.3–14.5)
SODIUM SERPL-SCNC: 139 MMOL/L — SIGNIFICANT CHANGE UP (ref 135–145)
WBC # BLD: 5.31 K/UL — SIGNIFICANT CHANGE UP (ref 3.8–10.5)
WBC # FLD AUTO: 5.31 K/UL — SIGNIFICANT CHANGE UP (ref 3.8–10.5)

## 2024-01-16 PROCEDURE — 93306 TTE W/DOPPLER COMPLETE: CPT | Mod: 26

## 2024-01-16 PROCEDURE — 36000 PLACE NEEDLE IN VEIN: CPT

## 2024-01-16 PROCEDURE — 70450 CT HEAD/BRAIN W/O DYE: CPT | Mod: 26

## 2024-01-16 PROCEDURE — 99232 SBSQ HOSP IP/OBS MODERATE 35: CPT

## 2024-01-16 PROCEDURE — 76937 US GUIDE VASCULAR ACCESS: CPT | Mod: 26

## 2024-01-16 RX ORDER — INSULIN LISPRO 100/ML
16 VIAL (ML) SUBCUTANEOUS
Refills: 0 | Status: DISCONTINUED | OUTPATIENT
Start: 2024-01-16 | End: 2024-01-17

## 2024-01-16 RX ORDER — LABETALOL HCL 100 MG
300 TABLET ORAL
Refills: 0 | Status: DISCONTINUED | OUTPATIENT
Start: 2024-01-16 | End: 2024-01-19

## 2024-01-16 RX ORDER — LABETALOL HCL 100 MG
200 TABLET ORAL
Refills: 0 | Status: DISCONTINUED | OUTPATIENT
Start: 2024-01-16 | End: 2024-01-16

## 2024-01-16 RX ORDER — HYDRALAZINE HCL 50 MG
2.5 TABLET ORAL ONCE
Refills: 0 | Status: COMPLETED | OUTPATIENT
Start: 2024-01-16 | End: 2024-01-16

## 2024-01-16 RX ORDER — INSULIN GLARGINE 100 [IU]/ML
56 INJECTION, SOLUTION SUBCUTANEOUS AT BEDTIME
Refills: 0 | Status: DISCONTINUED | OUTPATIENT
Start: 2024-01-16 | End: 2024-01-17

## 2024-01-16 RX ADMIN — LEVETIRACETAM 500 MILLIGRAM(S): 250 TABLET, FILM COATED ORAL at 05:25

## 2024-01-16 RX ADMIN — Medication 200 MILLIGRAM(S): at 16:48

## 2024-01-16 RX ADMIN — Medication 8: at 11:07

## 2024-01-16 RX ADMIN — Medication 4: at 07:55

## 2024-01-16 RX ADMIN — ATORVASTATIN CALCIUM 80 MILLIGRAM(S): 80 TABLET, FILM COATED ORAL at 22:00

## 2024-01-16 RX ADMIN — Medication 2: at 21:59

## 2024-01-16 RX ADMIN — Medication 6: at 16:48

## 2024-01-16 RX ADMIN — Medication 10 UNIT(S): at 11:08

## 2024-01-16 RX ADMIN — Medication 10 UNIT(S): at 07:55

## 2024-01-16 RX ADMIN — LEVETIRACETAM 500 MILLIGRAM(S): 250 TABLET, FILM COATED ORAL at 17:11

## 2024-01-16 RX ADMIN — Medication 2.5 MILLIGRAM(S): at 23:30

## 2024-01-16 RX ADMIN — GABAPENTIN 100 MILLIGRAM(S): 400 CAPSULE ORAL at 11:06

## 2024-01-16 RX ADMIN — Medication 16 UNIT(S): at 16:49

## 2024-01-16 RX ADMIN — INSULIN GLARGINE 56 UNIT(S): 100 INJECTION, SOLUTION SUBCUTANEOUS at 21:59

## 2024-01-16 NOTE — PHYSICAL THERAPY INITIAL EVALUATION ADULT - GENERAL OBSERVATIONS, REHAB EVAL
Pt was seen sitting at the edge of bed c cardiac monitor donned, alert and Ox4. Pt was comfortable and motivated.

## 2024-01-16 NOTE — PHYSICAL THERAPY INITIAL EVALUATION ADULT - PERTINENT HX OF CURRENT PROBLEM, REHAB EVAL
58F with HTN, HLD, IDDM2, and hx of stroke with residual R hemiparesis and speech difficulties here with RUE weakness and disorientation, thought to be related to hyperglycemia as  and UA with proteinuria and glucosuria  Stroke code called in ED but NIHSS deemed to be 0. CTH with hyperdensity in the L caudate head that appeared stable on 6h scan. CTH also remarkable for chronic R PCA infarct.

## 2024-01-16 NOTE — PROGRESS NOTE ADULT - ASSESSMENT
58F with HTN, HLD, IDDM2, and hx of stroke with residual R hemiparesis and speech difficulties here with RUE weakness and disorientation, thought to be related to hyperglycemia as  and UA with proteinuria and glucosuria  Stroke code called in ED but NIHSS deemed to be 0. CTH with hyperdensity in the L caudate head that appeared stable on 6h scan. CTH also remarkable for chronic R PCA infarct  EEG with diffuse slowing, no sz  , a1c 13.4    Worsening of chronic R hemiparesis likely stroke mimic in the setting of hyperglycemia, r/o new ischemic event. CTH appears more consistent with cavernoma vs calcification than ICH  Chronic R PCA Infarct - etiology ESUS    Plan:  - hold Aspirin 81mg until MRI brain performed  - would get MRA head and neck along with MRI  - atorvastatin 80mg  - endocrine eval for A1C, blood glucose control  - bp goal normotension  - check tte  - prolonged cardiac monitorig as outpatient  - can consider routine eeg but can likely hold off for now - reviewed  - pt/ot  - dvt ppx      Samantha Everett DO  Vascular Neurology  Office [phone]    58F with HTN, HLD, IDDM2, and hx of stroke with residual R hemiparesis and speech difficulties here with RUE weakness and disorientation, thought to be related to hyperglycemia as  and UA with proteinuria and glucosuria  Stroke code called in ED but NIHSS deemed to be 0. CTH with hyperdensity in the L caudate head that appeared stable on 6h scan. CTH also remarkable for chronic R PCA infarct  EEG with diffuse slowing, no sz  , a1c 13.4    Worsening of chronic R hemiparesis likely stroke mimic in the setting of hyperglycemia, r/o new ischemic event. CTH appears more consistent with cavernoma vs calcification than ICH  Chronic R PCA Infarct - etiology ESUS    Plan:  - hold Aspirin 81mg until MRI brain performed  - would get MRA head and neck along with MRI  - atorvastatin 80mg  - endocrine eval for A1C, blood glucose control  - bp goal normotension  - check tte  - prolonged cardiac monitorig as outpatient  - can consider routine eeg but can likely hold off for now - reviewed  - pt/ot  - dvt ppx      Samantha Everett DO  Vascular Neurology  Office [phone]    58F with HTN, HLD, IDDM2, and hx of stroke with residual R hemiparesis and speech difficulties here with RUE weakness and disorientation, thought to be related to hyperglycemia as  and UA with proteinuria and glucosuria  Stroke code called in ED but NIHSS deemed to be 0. CTH with hyperdensity in the L caudate head that appeared stable on 6h scan. CTH also remarkable for chronic R PCA infarct  EEG with diffuse slowing, no sz  , a1c 13.4    Worsening of chronic R hemiparesis likely stroke mimic in the setting of hyperglycemia, r/o new ischemic event. CTH appears more consistent with cavernoma vs calcification than ICH  Chronic R PCA Infarct - etiology ESUS    Plan:  - unable to fit in MRI machine. Would repeat CTH today - if stable ok to start Aspirin and will get outpatient open MRI  - atorvastatin 80mg  - endocrine eval for A1C, blood glucose control  - bp goal normotension  - check tte  - prolonged cardiac monitorig as outpatient  - can consider routine eeg but can likely hold off for now - reviewed  - pt/ot  - dvt ppx      Samantha Everett DO  Vascular Neurology  Office [phone]    58F with HTN, HLD, IDDM2, and hx of stroke with residual R hemiparesis and speech difficulties here with RUE weakness and disorientation, thought to be related to hyperglycemia as  and UA with proteinuria and glucosuria  Stroke code called in ED but NIHSS deemed to be 0. CTH with hyperdensity in the L caudate head that appeared stable on 6h scan. CTH also remarkable for chronic R PCA infarct  EEG with diffuse slowing, no sz  , a1c 13.4    Worsening of chronic R hemiparesis likely stroke mimic in the setting of hyperglycemia, r/o new ischemic event. CTH appears more consistent with cavernoma vs calcification than ICH  Chronic R PCA Infarct - etiology ESUS    Plan:  - unable to fit in MRI machine. Would repeat CTH today - if stable ok to start Aspirin and will get outpatient open MRI  - atorvastatin 80mg  - endocrine eval for A1C, blood glucose control  - bp goal normotension  - check tte  - prolonged cardiac monitorig as outpatient  - can consider routine eeg but can likely hold off for now - reviewed  - pt/ot  - dvt ppx      Samantha Everett DO  Vascular Neurology  Office [phone]

## 2024-01-16 NOTE — PROGRESS NOTE ADULT - ASSESSMENT
Tiffanie Burrell is a 58 year old female with PMHx of HTN, HLD, IDDM2, and hx of TIA who presented to the ED on 1/13/24 for complaints of right upper extremity weakness and admitted for suspected CVA.    Suspected CVA  Complaints of not feeling well while at [facility] around 10-11 AM today, felt disoriented and with RUE weakness afterwards  NIHSS 0 as per ER physician documentation  Physical exam without focal neurological deficits  [facility] stroke protocol with tiny focus of hyperattenuation in the left caudate head nucleus, suspected cavernoma, cannot exclude tiny hemorrhage  Repeat CT head (6 hours later) with stable small hyperdense focus in the left caudate nucleus head  Neurosurgery recommendations appreciated, no indication for transfer given CT head is stable  Empirically started on levetiracetam 500 mg BID x 7 days for seizure prophylaxis as per neurosurgery  Neuro checks q4, PTA atorvastatin 80 mg, will defer initiation of ASA 81 mg to neurology given ?tiny hemorrhage on initial CT head  TSH, lipid panel, A1c for risk stratification  Plan was for MRI brain however patient could not fit in machine  Advised by neuro to repeat CT brain - ordered   PT/OT consulted  Appreciate neuro input  Telemetry    Uncontrolled IDDM2 with hyperglycemia  Reports she ran out of insulin four days ago  A1C 13  Blood glucose 532 on admission, bicarb and anion gap WNL, U/A with glucosuria  S/p 1L NS bolus and insulin 5 U IV in the ED  Lantus/lispro/ ISS- increasws  Accucheck AC/HS  Endo consulted      Demand ischemia secondary to above  Troponin 91.7 on admission  f/u echocardiogram  Telemetry      HTN: PTA restarted labetalol 200 mg BID, depending on BP today, will start clonidine 0.3 mg patch      HLD: PTA atorvastatin 80 mg    DVT ppx : SCDs   Tiffanie Burrell is a 58 year old female with PMHx of HTN, HLD, IDDM2, and hx of TIA who presented to the ED on 1/13/24 for complaints of right upper extremity weakness and admitted for suspected CVA.    Suspected CVA  Complaints of not feeling well while at [facility] around 10-11 AM today, felt disoriented and with RUE weakness afterwards  NIHSS 0 as per ER physician documentation  Physical exam without focal neurological deficits  [facility] with tiny focus of hyperattenuation in the left caudate head nucleus, suspected cavernoma, cannot exclude tiny hemorrhage  Repeat CT head (6 hours later) with stable small hyperdense focus in the left caudate nucleus head  Neurosurgery recommendations appreciated, no indication for transfer given CT head is stable  Empirically started on levetiracetam 500 mg BID x 7 days for seizure prophylaxis as per neurosurgery  Neuro checks q4, PTA atorvastatin 80 mg, will defer initiation of ASA 81 mg to neurology given ?tiny hemorrhage on initial CT head  TSH, lipid panel, A1c for risk stratification  Plan was for MRI brain however patient could not fit in machine  Advised by neuro to repeat CT brain - ordered   PT/OT consulted  Appreciate neuro input  Telemetry    Uncontrolled IDDM2 with hyperglycemia  Reports she ran out of insulin four days ago  A1C 13  Blood glucose 532 on admission, bicarb and anion gap WNL, U/A with glucosuria  S/p 1L NS bolus and insulin 5 U IV in the ED  Lantus/lispro/ ISS- increasws  Accucheck AC/HS  Endo consulted      Demand ischemia secondary to above  Troponin 91.7 on admission  f/u echocardiogram  Telemetry      HTN: PTA restarted labetalol 200 mg BID, depending on BP today, will start clonidine 0.3 mg patch      HLD: PTA atorvastatin 80 mg    DVT ppx : SCDs

## 2024-01-16 NOTE — PHYSICAL THERAPY INITIAL EVALUATION ADULT - PREDICTED DURATION OF THERAPY (DAYS/WKS), PT EVAL
Pt is I in ambulation c a straight cane on level and stairs c good balance. Pt is at her baseline performance. Pt may benefit from out-pt P.T. Pt is discharge from physical therapy in the hospital. RNValarie was informed.

## 2024-01-16 NOTE — PHYSICAL THERAPY INITIAL EVALUATION ADULT - ADDITIONAL COMMENTS
As per pt : There are 5 steps, c close otis rails, to negotiate in her home environment. Pt ambulated c a straight cane as needed for R knee pain prior to admission. Pt has a rollator and a Rolling Walker for her mom and they are in good working condition. However, pt never needed to use them.

## 2024-01-16 NOTE — DIETITIAN INITIAL EVALUATION ADULT - OTHER INFO
Per conversation c pt she reports she is on disability & has been unable to get her insulin (Lantus) since October; medical record reflects she ran out of insulin 4 days PTA; per discussion c CM pt is non-compliant c medication & diet.  Pt admitted c AMS; suspect CVA; MRI pending. Denies any N/V/C/D or chew/swallowing difficulty. Pt reports she is aware of DM diet recommendations; refused educational materials.

## 2024-01-16 NOTE — DIETITIAN INITIAL EVALUATION ADULT - PERTINENT LABORATORY DATA
01-16    139  |  107  |  21  ----------------------------<  267<H>  4.0   |  29  |  0.75    Ca    8.7      16 Jan 2024 07:17    POCT Blood Glucose.: 331 mg/dL (01-16-24); 01-15 208, 277, 195, 372  A1C with Estimated Average Glucose Result: 13.4 %, 338 (01-14-24)

## 2024-01-16 NOTE — PROGRESS NOTE ADULT - SUBJECTIVE AND OBJECTIVE BOX
Neurology Progress Note    S: Patient seen and examined. No new events overnight. patient denied CP, SOB, HA or pain.     Medication:  acetaminophen     Tablet .. 650 milliGRAM(s) Oral every 6 hours PRN  aluminum hydroxide/magnesium hydroxide/simethicone Suspension 30 milliLiter(s) Oral every 4 hours PRN  atorvastatin 80 milliGRAM(s) Oral at bedtime  dextrose 5%. 1000 milliLiter(s) IV Continuous <Continuous>  dextrose 5%. 1000 milliLiter(s) IV Continuous <Continuous>  dextrose 50% Injectable 25 Gram(s) IV Push once  dextrose 50% Injectable 25 Gram(s) IV Push once  dextrose 50% Injectable 12.5 Gram(s) IV Push once  dextrose Oral Gel 15 Gram(s) Oral once PRN  gabapentin 100 milliGRAM(s) Oral daily  glucagon  Injectable 1 milliGRAM(s) IntraMuscular once  influenza   Vaccine 0.5 milliLiter(s) IntraMuscular once  insulin glargine Injectable (LANTUS) 52 Unit(s) SubCutaneous at bedtime  insulin lispro (ADMELOG) corrective regimen sliding scale   SubCutaneous three times a day before meals  insulin lispro (ADMELOG) corrective regimen sliding scale   SubCutaneous at bedtime  insulin lispro Injectable (ADMELOG) 10 Unit(s) SubCutaneous three times a day before meals  levETIRAcetam 500 milliGRAM(s) Oral two times a day  melatonin 3 milliGRAM(s) Oral at bedtime PRN  ondansetron Injectable 4 milliGRAM(s) IV Push every 8 hours PRN      Vitals:  Vital Signs Last 24 Hrs  T(C): 36.8 (16 Jan 2024 10:16), Max: 36.8 (15 Dexter 2024 15:43)  T(F): 98.3 (16 Jan 2024 10:16), Max: 98.3 (16 Jan 2024 10:16)  HR: 92 (16 Jan 2024 10:16) (71 - 98)  BP: 169/96 (16 Jan 2024 10:16) (169/96 - 186/95)  BP(mean): --  RR: 15 (16 Jan 2024 10:16) (15 - 18)  SpO2: 95% (16 Jan 2024 10:16) (93% - 96%)    Parameters below as of 16 Jan 2024 05:05  Patient On (Oxygen Delivery Method): room air        General Exam:   General Appearance: Appropriately dressed and in no acute distress       Head: Normocephalic, atraumatic and no dysmorphic features  Ear, Nose, and Throat: Moist mucous membranes  CVS: S1S2+  Resp: No SOB, no wheeze or rhonchi  Abd: soft NTND  Extremities: No edema, no cyanosis  Skin: No bruises, no rashes     Neurological Exam:  Mental Status: Awake, alert and oriented x 3.  Able to follow simple and complex verbal commands. Able to name and repeat. speech is dysfluent with occasional word finding difficulty.   Cranial Nerves: PERRL, EOMI,  ? L sup quadrantopia, sensation V1-V3 intact,  no obvious facial asymmetry, equal elevation of palate, scm/trap 5/5, tongue is midline on protrusion.  hearing is grossly intact.   Motor: RUE and RLE drift - RLE from arthritic pain   Sensation: Intact to light touch and pinprick throughout. no right/left confusion. no extinction to tactile on DSS.   Reflexes: 1+ throughout at biceps, brachioradialis, triceps, patellars and ankles bilaterally and equal. No clonus. R toe and L toe were both downgoing.  Coordination: No dysmetria on FNF  Gait:  deferred    I personally reviewed the below data/images/labs:      CBC Full  -  ( 16 Jan 2024 07:17 )  WBC Count : 5.31 K/uL  RBC Count : 4.60 M/uL  Hemoglobin : 13.2 g/dL  Hematocrit : 39.3 %  Platelet Count - Automated : 198 K/uL  Mean Cell Volume : 85.4 fl  Mean Cell Hemoglobin : 28.7 pg  Mean Cell Hemoglobin Concentration : 33.6 g/dL  Auto Neutrophil # : x  Auto Lymphocyte # : x  Auto Monocyte # : x  Auto Eosinophil # : x  Auto Basophil # : x  Auto Neutrophil % : x  Auto Lymphocyte % : x  Auto Monocyte % : x  Auto Eosinophil % : x  Auto Basophil % : x    01-16    139  |  107  |  21  ----------------------------<  267<H>  4.0   |  29  |  0.75    Ca    8.7      16 Jan 2024 07:17          Urinalysis Basic - ( 16 Jan 2024 07:17 )    Color: x / Appearance: x / SG: x / pH: x  Gluc: 267 mg/dL / Ketone: x  / Bili: x / Urobili: x   Blood: x / Protein: x / Nitrite: x   Leuk Esterase: x / RBC: x / WBC x   Sq Epi: x / Non Sq Epi: x / Bacteria: x      EEG Classification / Summary:  Abnormal EEG study  Mild shifting generalized background slowing, GRDA    -----------------------------------------------------------------------------------------------------    Clinical Impression:  Mild diffuse/multi-focal cerebral dysfunction, not specific as to etiology.  There were no epileptiform abnormalities recorded.      -------------------------------------------------------------------------- Neurology Progress Note    S: Patient seen and examined. No new events overnight. Unable to fit in MRI machine    Medication:  acetaminophen     Tablet .. 650 milliGRAM(s) Oral every 6 hours PRN  aluminum hydroxide/magnesium hydroxide/simethicone Suspension 30 milliLiter(s) Oral every 4 hours PRN  atorvastatin 80 milliGRAM(s) Oral at bedtime  dextrose 5%. 1000 milliLiter(s) IV Continuous <Continuous>  dextrose 5%. 1000 milliLiter(s) IV Continuous <Continuous>  dextrose 50% Injectable 25 Gram(s) IV Push once  dextrose 50% Injectable 25 Gram(s) IV Push once  dextrose 50% Injectable 12.5 Gram(s) IV Push once  dextrose Oral Gel 15 Gram(s) Oral once PRN  gabapentin 100 milliGRAM(s) Oral daily  glucagon  Injectable 1 milliGRAM(s) IntraMuscular once  influenza   Vaccine 0.5 milliLiter(s) IntraMuscular once  insulin glargine Injectable (LANTUS) 52 Unit(s) SubCutaneous at bedtime  insulin lispro (ADMELOG) corrective regimen sliding scale   SubCutaneous three times a day before meals  insulin lispro (ADMELOG) corrective regimen sliding scale   SubCutaneous at bedtime  insulin lispro Injectable (ADMELOG) 10 Unit(s) SubCutaneous three times a day before meals  levETIRAcetam 500 milliGRAM(s) Oral two times a day  melatonin 3 milliGRAM(s) Oral at bedtime PRN  ondansetron Injectable 4 milliGRAM(s) IV Push every 8 hours PRN      Vitals:  Vital Signs Last 24 Hrs  T(C): 36.8 (16 Jan 2024 10:16), Max: 36.8 (15 Dexter 2024 15:43)  T(F): 98.3 (16 Jan 2024 10:16), Max: 98.3 (16 Jan 2024 10:16)  HR: 92 (16 Jan 2024 10:16) (71 - 98)  BP: 169/96 (16 Jan 2024 10:16) (169/96 - 186/95)  BP(mean): --  RR: 15 (16 Jan 2024 10:16) (15 - 18)  SpO2: 95% (16 Jan 2024 10:16) (93% - 96%)    Parameters below as of 16 Jan 2024 05:05  Patient On (Oxygen Delivery Method): room air        General Exam:   General Appearance: Appropriately dressed and in no acute distress       Head: Normocephalic, atraumatic and no dysmorphic features  Ear, Nose, and Throat: Moist mucous membranes  CVS: S1S2+  Resp: No SOB, no wheeze or rhonchi  Abd: soft NTND  Extremities: No edema, no cyanosis  Skin: No bruises, no rashes     Neurological Exam:  Mental Status: Awake, alert and oriented x 3.  Able to follow simple and complex verbal commands. Able to name and repeat. speech is dysfluent with occasional word finding difficulty.   Cranial Nerves: PERRL, EOMI,  ? L sup quadrantopia, sensation V1-V3 intact,  no obvious facial asymmetry, equal elevation of palate, scm/trap 5/5, tongue is midline on protrusion.  hearing is grossly intact.   Motor: RUE and RLE drift - RLE from arthritic pain   Sensation: Intact to light touch and pinprick throughout. no right/left confusion. no extinction to tactile on DSS.   Reflexes: 1+ throughout at biceps, brachioradialis, triceps, patellars and ankles bilaterally and equal. No clonus. R toe and L toe were both downgoing.  Coordination: No dysmetria on FNF  Gait:  deferred    I personally reviewed the below data/images/labs:      CBC Full  -  ( 16 Jan 2024 07:17 )  WBC Count : 5.31 K/uL  RBC Count : 4.60 M/uL  Hemoglobin : 13.2 g/dL  Hematocrit : 39.3 %  Platelet Count - Automated : 198 K/uL  Mean Cell Volume : 85.4 fl  Mean Cell Hemoglobin : 28.7 pg  Mean Cell Hemoglobin Concentration : 33.6 g/dL  Auto Neutrophil # : x  Auto Lymphocyte # : x  Auto Monocyte # : x  Auto Eosinophil # : x  Auto Basophil # : x  Auto Neutrophil % : x  Auto Lymphocyte % : x  Auto Monocyte % : x  Auto Eosinophil % : x  Auto Basophil % : x    01-16    139  |  107  |  21  ----------------------------<  267<H>  4.0   |  29  |  0.75    Ca    8.7      16 Jan 2024 07:17          Urinalysis Basic - ( 16 Jan 2024 07:17 )    Color: x / Appearance: x / SG: x / pH: x  Gluc: 267 mg/dL / Ketone: x  / Bili: x / Urobili: x   Blood: x / Protein: x / Nitrite: x   Leuk Esterase: x / RBC: x / WBC x   Sq Epi: x / Non Sq Epi: x / Bacteria: x      EEG Classification / Summary:  Abnormal EEG study  Mild shifting generalized background slowing, GRDA    -----------------------------------------------------------------------------------------------------    Clinical Impression:  Mild diffuse/multi-focal cerebral dysfunction, not specific as to etiology.  There were no epileptiform abnormalities recorded.      -------------------------------------------------------------------------- Neurology Progress Note    S: Patient seen and examined. No new events overnight. Unable to fit in MRI machine    Medication:  acetaminophen     Tablet .. 650 milliGRAM(s) Oral every 6 hours PRN  aluminum hydroxide/magnesium hydroxide/simethicone Suspension 30 milliLiter(s) Oral every 4 hours PRN  atorvastatin 80 milliGRAM(s) Oral at bedtime  dextrose 5%. 1000 milliLiter(s) IV Continuous <Continuous>  dextrose 5%. 1000 milliLiter(s) IV Continuous <Continuous>  dextrose 50% Injectable 25 Gram(s) IV Push once  dextrose 50% Injectable 25 Gram(s) IV Push once  dextrose 50% Injectable 12.5 Gram(s) IV Push once  dextrose Oral Gel 15 Gram(s) Oral once PRN  gabapentin 100 milliGRAM(s) Oral daily  glucagon  Injectable 1 milliGRAM(s) IntraMuscular once  influenza   Vaccine 0.5 milliLiter(s) IntraMuscular once  insulin glargine Injectable (LANTUS) 52 Unit(s) SubCutaneous at bedtime  insulin lispro (ADMELOG) corrective regimen sliding scale   SubCutaneous three times a day before meals  insulin lispro (ADMELOG) corrective regimen sliding scale   SubCutaneous at bedtime  insulin lispro Injectable (ADMELOG) 10 Unit(s) SubCutaneous three times a day before meals  levETIRAcetam 500 milliGRAM(s) Oral two times a day  melatonin 3 milliGRAM(s) Oral at bedtime PRN  ondansetron Injectable 4 milliGRAM(s) IV Push every 8 hours PRN      Vitals:  Vital Signs Last 24 Hrs  T(C): 36.8 (16 Jan 2024 10:16), Max: 36.8 (15 Dxeter 2024 15:43)  T(F): 98.3 (16 Jan 2024 10:16), Max: 98.3 (16 Jan 2024 10:16)  HR: 92 (16 Jan 2024 10:16) (71 - 98)  BP: 169/96 (16 Jan 2024 10:16) (169/96 - 186/95)  BP(mean): --  RR: 15 (16 Jan 2024 10:16) (15 - 18)  SpO2: 95% (16 Jan 2024 10:16) (93% - 96%)    Parameters below as of 16 Jan 2024 05:05  Patient On (Oxygen Delivery Method): room air        General Exam:   General Appearance: Appropriately dressed and in no acute distress       Head: Normocephalic, atraumatic and no dysmorphic features  Ear, Nose, and Throat: Moist mucous membranes  CVS: S1S2+  Resp: No SOB, no wheeze or rhonchi  Abd: soft NTND  Extremities: No edema, no cyanosis  Skin: No bruises, no rashes     Neurological Exam:  Mental Status: Awake, alert and oriented x 3.  Able to follow simple and complex verbal commands. Able to name and repeat. speech is dysfluent with occasional word finding difficulty.   Cranial Nerves: PERRL, EOMI,  ? L sup quadrantopia, sensation V1-V3 intact,  no obvious facial asymmetry, equal elevation of palate, scm/trap 5/5, tongue is midline on protrusion.  hearing is grossly intact.   Motor: RUE and RLE drift - RLE from arthritic pain   Sensation: Intact to light touch and pinprick throughout. no right/left confusion. no extinction to tactile on DSS.   Reflexes: 1+ throughout at biceps, brachioradialis, triceps, patellars and ankles bilaterally and equal. No clonus. R toe and L toe were both downgoing.  Coordination: No dysmetria on FNF  Gait:  deferred    I personally reviewed the below data/images/labs:      CBC Full  -  ( 16 Jan 2024 07:17 )  WBC Count : 5.31 K/uL  RBC Count : 4.60 M/uL  Hemoglobin : 13.2 g/dL  Hematocrit : 39.3 %  Platelet Count - Automated : 198 K/uL  Mean Cell Volume : 85.4 fl  Mean Cell Hemoglobin : 28.7 pg  Mean Cell Hemoglobin Concentration : 33.6 g/dL  Auto Neutrophil # : x  Auto Lymphocyte # : x  Auto Monocyte # : x  Auto Eosinophil # : x  Auto Basophil # : x  Auto Neutrophil % : x  Auto Lymphocyte % : x  Auto Monocyte % : x  Auto Eosinophil % : x  Auto Basophil % : x    01-16    139  |  107  |  21  ----------------------------<  267<H>  4.0   |  29  |  0.75    Ca    8.7      16 Jan 2024 07:17          Urinalysis Basic - ( 16 Jan 2024 07:17 )    Color: x / Appearance: x / SG: x / pH: x  Gluc: 267 mg/dL / Ketone: x  / Bili: x / Urobili: x   Blood: x / Protein: x / Nitrite: x   Leuk Esterase: x / RBC: x / WBC x   Sq Epi: x / Non Sq Epi: x / Bacteria: x      EEG Classification / Summary:  Abnormal EEG study  Mild shifting generalized background slowing, GRDA    -----------------------------------------------------------------------------------------------------    Clinical Impression:  Mild diffuse/multi-focal cerebral dysfunction, not specific as to etiology.  There were no epileptiform abnormalities recorded.      --------------------------------------------------------------------------

## 2024-01-17 LAB
GLUCOSE BLDC GLUCOMTR-MCNC: 187 MG/DL — HIGH (ref 70–99)
GLUCOSE BLDC GLUCOMTR-MCNC: 206 MG/DL — HIGH (ref 70–99)
GLUCOSE BLDC GLUCOMTR-MCNC: 235 MG/DL — HIGH (ref 70–99)
GLUCOSE BLDC GLUCOMTR-MCNC: 241 MG/DL — HIGH (ref 70–99)

## 2024-01-17 PROCEDURE — 99232 SBSQ HOSP IP/OBS MODERATE 35: CPT

## 2024-01-17 RX ORDER — INSULIN GLARGINE 100 [IU]/ML
61 INJECTION, SOLUTION SUBCUTANEOUS AT BEDTIME
Refills: 0 | Status: DISCONTINUED | OUTPATIENT
Start: 2024-01-17 | End: 2024-01-18

## 2024-01-17 RX ORDER — AMLODIPINE BESYLATE 2.5 MG/1
10 TABLET ORAL DAILY
Refills: 0 | Status: DISCONTINUED | OUTPATIENT
Start: 2024-01-18 | End: 2024-01-19

## 2024-01-17 RX ORDER — AMLODIPINE BESYLATE 2.5 MG/1
5 TABLET ORAL DAILY
Refills: 0 | Status: DISCONTINUED | OUTPATIENT
Start: 2024-01-17 | End: 2024-01-17

## 2024-01-17 RX ORDER — ASPIRIN/CALCIUM CARB/MAGNESIUM 324 MG
81 TABLET ORAL DAILY
Refills: 0 | Status: DISCONTINUED | OUTPATIENT
Start: 2024-01-17 | End: 2024-01-19

## 2024-01-17 RX ORDER — INSULIN GLARGINE 100 [IU]/ML
5 INJECTION, SOLUTION SUBCUTANEOUS ONCE
Refills: 0 | Status: COMPLETED | OUTPATIENT
Start: 2024-01-17 | End: 2024-01-17

## 2024-01-17 RX ORDER — HYDRALAZINE HCL 50 MG
10 TABLET ORAL EVERY 6 HOURS
Refills: 0 | Status: DISCONTINUED | OUTPATIENT
Start: 2024-01-17 | End: 2024-01-19

## 2024-01-17 RX ORDER — ENOXAPARIN SODIUM 100 MG/ML
40 INJECTION SUBCUTANEOUS EVERY 24 HOURS
Refills: 0 | Status: DISCONTINUED | OUTPATIENT
Start: 2024-01-17 | End: 2024-01-19

## 2024-01-17 RX ORDER — INSULIN LISPRO 100/ML
20 VIAL (ML) SUBCUTANEOUS
Refills: 0 | Status: DISCONTINUED | OUTPATIENT
Start: 2024-01-17 | End: 2024-01-18

## 2024-01-17 RX ADMIN — Medication 4: at 11:49

## 2024-01-17 RX ADMIN — Medication 16 UNIT(S): at 06:45

## 2024-01-17 RX ADMIN — Medication 10 MILLIGRAM(S): at 18:15

## 2024-01-17 RX ADMIN — AMLODIPINE BESYLATE 5 MILLIGRAM(S): 2.5 TABLET ORAL at 08:46

## 2024-01-17 RX ADMIN — Medication 300 MILLIGRAM(S): at 17:52

## 2024-01-17 RX ADMIN — Medication 20 UNIT(S): at 11:50

## 2024-01-17 RX ADMIN — Medication 4: at 17:11

## 2024-01-17 RX ADMIN — Medication 1 PATCH: at 18:08

## 2024-01-17 RX ADMIN — LEVETIRACETAM 500 MILLIGRAM(S): 250 TABLET, FILM COATED ORAL at 06:11

## 2024-01-17 RX ADMIN — Medication 81 MILLIGRAM(S): at 11:53

## 2024-01-17 RX ADMIN — Medication 300 MILLIGRAM(S): at 06:26

## 2024-01-17 RX ADMIN — LEVETIRACETAM 500 MILLIGRAM(S): 250 TABLET, FILM COATED ORAL at 17:12

## 2024-01-17 RX ADMIN — INSULIN GLARGINE 61 UNIT(S): 100 INJECTION, SOLUTION SUBCUTANEOUS at 22:39

## 2024-01-17 RX ADMIN — Medication 4: at 06:45

## 2024-01-17 RX ADMIN — Medication 1 PATCH: at 11:51

## 2024-01-17 RX ADMIN — INSULIN GLARGINE 5 UNIT(S): 100 INJECTION, SOLUTION SUBCUTANEOUS at 09:06

## 2024-01-17 RX ADMIN — GABAPENTIN 100 MILLIGRAM(S): 400 CAPSULE ORAL at 11:49

## 2024-01-17 RX ADMIN — ENOXAPARIN SODIUM 40 MILLIGRAM(S): 100 INJECTION SUBCUTANEOUS at 08:47

## 2024-01-17 RX ADMIN — ATORVASTATIN CALCIUM 80 MILLIGRAM(S): 80 TABLET, FILM COATED ORAL at 22:39

## 2024-01-17 RX ADMIN — Medication 20 UNIT(S): at 17:12

## 2024-01-17 NOTE — CHART NOTE - NSCHARTNOTEFT_GEN_A_CORE
The above patient is currently admitted at [facility] since 1/13/24 through today, 1/17/24.    Thank you.  Daniela Lynn NP  Dept of Medicine The above patient is currently admitted at [facility].  Pt has been admitted since 1/13/24.    Thank you.  Daniela Crain NP  Dept of Medicine

## 2024-01-17 NOTE — CONSULT NOTE ADULT - PROBLEM SELECTOR RECOMMENDATION 9
Patient is on 120 units of insulin which is promoting a through [facility].  In the setting of CVA insulin should be just optimized.  Decrease the Lantus to 45 units for now and that of lispro to 15 units.  If no further CAT scan and other tests are being done to investigate her CVA, then low-dose metformin 500 mg should be started to decrease the insulin resistance which patient has been a severe form.  Diet and exercise upon discharge to decrease her weight and insulin resistance.  Ultimately insulin should be weaned off as much as possible  Thank you for the courtesy of this consultation

## 2024-01-17 NOTE — PROGRESS NOTE ADULT - ASSESSMENT
58F with HTN, HLD, IDDM2, and hx of stroke with residual R hemiparesis and speech difficulties here with RUE weakness and disorientation, thought to be related to hyperglycemia as  and UA with proteinuria and glucosuria  Stroke code called in ED but NIHSS deemed to be 0. CTH with hyperdensity in the L caudate head that appeared stable on 6h scan. CTH also remarkable for chronic R PCA, L basal ganglia and L  infarcts. Repeat CTH 1/16 unchanged  EEG with diffuse slowing, no sz  , a1c 13.4    Worsening of chronic R hemiparesis likely stroke mimic in the setting of hyperglycemia, r/o new ischemic event. CTH appears more consistent with cavernoma vs calcification than ICH  Chronic embolic and small vessel appearing infarcts    Plan:  - start aspirin 81   - unable to fit in MRI machine. Will get outpatient open MRI/A  - atorvastatin 80mg  - endocrine eval for A1C, blood glucose control  - bp goal normotension  - tte reviewed   - prolonged cardiac monitoring as outpatient  - can consider routine eeg but can likely hold off for now - reviewed  - pt/ot  - dvt ppx      Samantha Everett DO  Vascular Neurology  Office [phone]    58F with HTN, HLD, IDDM2, and hx of stroke with residual R hemiparesis and speech difficulties here with RUE weakness and disorientation, thought to be related to hyperglycemia as  and UA with proteinuria and glucosuria  Stroke code called in ED but NIHSS deemed to be 0. CTH with hyperdensity in the L caudate head that appeared stable on 6h scan. CTH also remarkable for chronic R PCA, L basal ganglia and L  infarcts. Repeat CTH 1/16 unchanged  EEG with diffuse slowing, no sz  , a1c 13.4    Worsening of chronic R hemiparesis likely stroke mimic in the setting of hyperglycemia, r/o new ischemic event. CTH appears more consistent with cavernoma vs calcification than ICH  Chronic embolic and small vessel appearing infarcts    Plan:  - start aspirin 81   - unable to fit in MRI machine. Will get outpatient open MRI/A  - atorvastatin 80mg  - endocrine eval for A1C, blood glucose control  - bp goal normotension  - tte reviewed   - prolonged cardiac monitoring as outpatient  - can consider routine eeg but can likely hold off for now - reviewed  - pt/ot  - dvt ppx  dc planning    Samantha Everett DO  Vascular Neurology  Office [phone]

## 2024-01-17 NOTE — PROGRESS NOTE ADULT - ASSESSMENT
58F with HTN, HLD, IDDM2, and hx of stroke with residual R hemiparesis and speech difficulties here with RUE weakness and disorientation, thought to be related to hyperglycemia as  and UA with proteinuria and glucosuria  Stroke code called in ED but NIHSS deemed to be 0. CTH with hyperdensity in the L caudate head that appeared stable on 6h scan. CTH also remarkable for chronic R PCA, L basal ganglia and L  infarcts. Repeat CTH 1/16 unchanged  EEG with diffuse slowing, no sz  , a1c 13.4    Suspected CVA vs symptoms in setting of hypertensive urgency POA   Worsening of chronic R hemiparesis possibly stroke mimic in the setting of hyperglycemia,  symptoms resolved after BP improved   Empirically started on levetiracetam 500 mg BID x 7 days for seizure prophylaxis as per neurosurgery>>stopped   ECHO: pEF, grade 1 DD, bubble study without evidence of inter-atrial flow.  neurology following   patient unable to fit into MRI machine , will need outpatient open MRI/MRA ; prolonged cardiac monitoring as outpatient  continue with ASA, high intensity statin   PT: outpatient PT     elevated troponin , likely in setting of CVA vs demand in setting of hypertensive urgency POA   no e/o ACS   BP in triage 192/128    Uncontrolled IDDM2 with hyperglycemia  A1C 13% ; CM spoke with patient's pharmacy, patient last filled insulin and seen her PMD in 11/2023 ; has not been back since   Blood glucose 532 on admission, bicarb and anion gap WNL, U/A with glucosuria  S/p 1L NS bolus and insulin 5 U IV in the ED  Endo consulted appreciated   continue with lantus 45u QHS, lispro 15u ac   added metformin 500mg bid     dizziness  follow orthostatics     HTN:  continue with current meds     HLD: PTA atorvastatin 80 mg daily     Preventative Measures   lovenox SQ-dvt ppx  fall precautions

## 2024-01-17 NOTE — CONSULT NOTE ADULT - SUBJECTIVE AND OBJECTIVE BOX
Patient is a 58y old  Female who presents with a chief complaint of Suspected CVA (17 Jan 2024 07:39)      Reason For Consult: uncontrolled  diabetes     HPI:  Tiffanie Burrell is a 58 year old female with PMHx of HTN, HLD, IDDM2, and hx of TIA who presented to the ED on 1/13/24 for complaints of right upper extremity weakness.    History is very limited as patient is a poor historian. Patient reports she was not feeling well this morning while at [facility] around 10-11 AM. [place] disoriented and started having RUE weakness. Her friend at [facility] called EMS. Of note, she works as a CNA at a hospital in [facility] and went to the ER there a few days ago because she was not feeling well. Given a bolus of fluids but not admitted. States she ran out of insulin 4 days ago and there were no refills for her insulin at her pharmacy.     In the ED, VSS except HR as elevated as 95 and BP as elevated as 192/128. Labs grossly unremarkable except blood glucose 532 and troponin 91.7. U/A with proteinuria and glucosuria. Initially was called as a code stroke given EMS report but given NIHSS 0, ER physician cancelled CTA H/N. [facility] stroke protocol with tiny focus of hyperattenuation in the left caudate head nucleus. Suspected cavernoma. Cannot exclude tiny hemorrhage. ER physician, Dr. Kee spoke with neurosurgery, Dr. Liza Gary who recommended repeat CT head in 6 hours and if stable, no indication for transfer but to start prophylactic keppra 500 mg x 7 days for seizure prevention. Repeat CT head with small hyperdense focus in the left caudate nucleus head remains stable. Received 1L NS bolus, insulin 5 U IV, acetaminophen 1 g IV, morphine 4 mg IV, and metoclopramide 10 mg IV. Back to baseline at the time of my examination. (13 Jan 2024 23:51)    Patient admitted with hypertensive encephalopathy and mild  findings on MRI.  Currently normal evidence of any weakness or CVA.  HbA1c 13.4.  Patient on 52 units of Lantus and?  Any other diabetic medications.  Patient is a poor historian and seemed apathetic towards her diabetes.  When asked if she was intolerant to metformin she could not give Metformin a clear-cut answer.  Currently on 61 units of Lantus and 20 units of prandial lispro and fingersticks running in the mid 200s or slightly below  PAST MEDICAL & SURGICAL HISTORY:  HTN (hypertension)      HLD (hyperlipidemia)      Insulin dependent type 2 diabetes mellitus      History of TIA (transient ischemic attack)          FAMILY HISTORY:        Social History:    MEDICATIONS  (STANDING):  aspirin enteric coated 81 milliGRAM(s) Oral daily  atorvastatin 80 milliGRAM(s) Oral at bedtime  cloNIDine Patch 0.3 mG/24Hr(s) 1 patch Transdermal every 7 days  dextrose 5%. 1000 milliLiter(s) (50 mL/Hr) IV Continuous <Continuous>  dextrose 5%. 1000 milliLiter(s) (100 mL/Hr) IV Continuous <Continuous>  dextrose 50% Injectable 25 Gram(s) IV Push once  dextrose 50% Injectable 25 Gram(s) IV Push once  dextrose 50% Injectable 12.5 Gram(s) IV Push once  enoxaparin Injectable 40 milliGRAM(s) SubCutaneous every 24 hours  gabapentin 100 milliGRAM(s) Oral daily  glucagon  Injectable 1 milliGRAM(s) IntraMuscular once  influenza   Vaccine 0.5 milliLiter(s) IntraMuscular once  insulin glargine Injectable (LANTUS) 61 Unit(s) SubCutaneous at bedtime  insulin lispro (ADMELOG) corrective regimen sliding scale   SubCutaneous three times a day before meals  insulin lispro (ADMELOG) corrective regimen sliding scale   SubCutaneous at bedtime  insulin lispro Injectable (ADMELOG) 20 Unit(s) SubCutaneous three times a day before meals  labetalol 300 milliGRAM(s) Oral two times a day  levETIRAcetam 500 milliGRAM(s) Oral two times a day    MEDICATIONS  (PRN):  acetaminophen     Tablet .. 650 milliGRAM(s) Oral every 6 hours PRN Temp greater or equal to 38C (100.4F), Mild Pain (1 - 3)  aluminum hydroxide/magnesium hydroxide/simethicone Suspension 30 milliLiter(s) Oral every 4 hours PRN Dyspepsia  dextrose Oral Gel 15 Gram(s) Oral once PRN Blood Glucose LESS THAN 70 milliGRAM(s)/deciliter  hydrALAZINE Injectable 10 milliGRAM(s) IV Push every 6 hours PRN if SBP >170 or DBP >100  melatonin 3 milliGRAM(s) Oral at bedtime PRN Insomnia  ondansetron Injectable 4 milliGRAM(s) IV Push every 8 hours PRN Nausea and/or Vomiting      REVIEW OF SYSTEMS:  CONSTITUTIONAL:  as per HPI  HEENT:  Eyes:  No diplopia or blurred vision.   ENT:  No earache, sore throat or runny nose.  CARDIOVASCULAR:  No chest pain .  RESPIRATORY:  No cough, shortness of breath, PND or orthopnea.  GASTROINTESTINAL:  No nausea, vomiting or diarrhea.  GENITOURINARY:  No dysuria, frequency or urgency. No Blood in urine  MUSCULOSKELETAL:  no joint aches, no muscle pain, myalgia  SKIN:  No change in skin, hair or nails.  NEUROLOGIC:  No paresthesias, fasciculations, seizures or weakness.  PSYCHIATRIC:  No disorder of thought or mood.  ENDOCRINE:  No heat or cold intolerance, polyuria or polydipsia. abnormal weight gain or loss, oral thrush  HEMATOLOGICAL:  No easy bruising or bleeding.     T(C): 36.8 (01-17-24 @ 17:03), Max: 36.9 (01-17-24 @ 12:18)  HR: 82 (01-17-24 @ 17:03) (74 - 82)  BP: 167/92 (01-17-24 @ 12:18) (165/89 - 177/96)  RR: 18 (01-17-24 @ 17:03) (17 - 19)  SpO2: 94% (01-17-24 @ 17:03) (93% - 96%)  Wt(kg): --    PHYSICAL EXAM:  GENERAL: NAD, well-groomed, well-developed  HEAD:  Atraumatic, Normocephalic  EYES: PERRLA, conjunctiva and sclera clear  ENMT: No  exudates,, Moist mucous membranes,, No lesions  NECK: Supple, No JVD,   NERVOUS SYSTEM:  Alert & Oriented   CHEST/LUNG: Clear to percussion bilaterally; No rales, rhonchi, wheezing, or rubs  HEART: Regular rate and rhythm; No murmurs, rubs, or gallops  ABDOMEN: Soft, Nontender, Nondistended; Bowel sounds present  EXTREMITIES:  2+ Peripheral Pulses, No clubbing, cyanosis, or edema  LYMPH: No lymphadenopathy noted  SKIN: No rashes or lesions    CAPILLARY BLOOD GLUCOSE      POCT Blood Glucose.: 206 mg/dL (17 Jan 2024 16:49)  POCT Blood Glucose.: 241 mg/dL (17 Jan 2024 11:35)  POCT Blood Glucose.: 235 mg/dL (17 Jan 2024 06:40)  POCT Blood Glucose.: 277 mg/dL (16 Jan 2024 21:11)                            13.2   5.31  )-----------( 198      ( 16 Jan 2024 07:17 )             39.3       CMP:  01-16 @ 07:17  SGPT --  Albumin --   Alk Phos --   Anion Gap 3   SGOT --   Total Bili --   BUN 21   Calcium Total 8.7   CO2 29   Chloride 107   Creatinine 0.75   eGFR if AA --   eGFR if non AA --   Glucose 267   Potassium 4.0   Protein --   Sodium 139      Thyroid Function Tests:      Diabetes Tests:     Parathyroids:     Adrenals:       Radiology:

## 2024-01-17 NOTE — PROGRESS NOTE ADULT - SUBJECTIVE AND OBJECTIVE BOX
Neurology Progress Note    S: Patient seen and examined. Repeat CTH done    Medications: MEDICATIONS  (STANDING):  atorvastatin 80 milliGRAM(s) Oral at bedtime  cloNIDine Patch 0.3 mG/24Hr(s) 1 patch Transdermal once  dextrose 5%. 1000 milliLiter(s) (50 mL/Hr) IV Continuous <Continuous>  dextrose 5%. 1000 milliLiter(s) (100 mL/Hr) IV Continuous <Continuous>  dextrose 50% Injectable 25 Gram(s) IV Push once  dextrose 50% Injectable 25 Gram(s) IV Push once  dextrose 50% Injectable 12.5 Gram(s) IV Push once  gabapentin 100 milliGRAM(s) Oral daily  glucagon  Injectable 1 milliGRAM(s) IntraMuscular once  influenza   Vaccine 0.5 milliLiter(s) IntraMuscular once  insulin glargine Injectable (LANTUS) 56 Unit(s) SubCutaneous at bedtime  insulin lispro (ADMELOG) corrective regimen sliding scale   SubCutaneous three times a day before meals  insulin lispro (ADMELOG) corrective regimen sliding scale   SubCutaneous at bedtime  insulin lispro Injectable (ADMELOG) 16 Unit(s) SubCutaneous three times a day before meals  labetalol 300 milliGRAM(s) Oral two times a day  levETIRAcetam 500 milliGRAM(s) Oral two times a day    MEDICATIONS  (PRN):  acetaminophen     Tablet .. 650 milliGRAM(s) Oral every 6 hours PRN Temp greater or equal to 38C (100.4F), Mild Pain (1 - 3)  aluminum hydroxide/magnesium hydroxide/simethicone Suspension 30 milliLiter(s) Oral every 4 hours PRN Dyspepsia  dextrose Oral Gel 15 Gram(s) Oral once PRN Blood Glucose LESS THAN 70 milliGRAM(s)/deciliter  melatonin 3 milliGRAM(s) Oral at bedtime PRN Insomnia  ondansetron Injectable 4 milliGRAM(s) IV Push every 8 hours PRN Nausea and/or Vomiting       Vitals:  Vital Signs Last 24 Hrs  T(C): 36.8 (17 Jan 2024 05:05), Max: 37.1 (16 Jan 2024 16:14)  T(F): 98.2 (17 Jan 2024 05:05), Max: 98.7 (16 Jan 2024 16:14)  HR: 74 (17 Jan 2024 05:05) (74 - 94)  BP: 165/89 (17 Jan 2024 05:05) (165/89 - 190/92)  BP(mean): --  RR: 19 (17 Jan 2024 05:05) (15 - 19)  SpO2: 93% (17 Jan 2024 05:05) (92% - 97%)    Parameters below as of 17 Jan 2024 05:05  Patient On (Oxygen Delivery Method): room air            General Exam:   General Appearance: Appropriately dressed and in no acute distress       Head: Normocephalic, atraumatic and no dysmorphic features  Ear, Nose, and Throat: Moist mucous membranes  CVS: S1S2+  Resp: No SOB, no wheeze or rhonchi  Abd: soft NTND  Extremities: No edema, no cyanosis  Skin: No bruises, no rashes     Neurological Exam:  Mental Status: Awake, alert and oriented x 3.  Able to follow simple and complex verbal commands. Able to name and repeat. speech is dysfluent with occasional word finding difficulty.   Cranial Nerves: PERRL, EOMI,  ? L sup quadrantopia, sensation V1-V3 intact,  no obvious facial asymmetry, equal elevation of palate, scm/trap 5/5, tongue is midline on protrusion.  hearing is grossly intact.   Motor: RUE and RLE drift - RLE from arthritic pain   Sensation: Intact to light touch and pinprick throughout. no right/left confusion. no extinction to tactile on DSS.   Reflexes: 1+ throughout at biceps, brachioradialis, triceps, patellars and ankles bilaterally and equal. No clonus. R toe and L toe were both downgoing.  Coordination: No dysmetria on FNF  Gait:  deferred    I personally reviewed the below data/images/labs:  < from: CT Brain Stroke Protocol (01.13.24 @ 15:42) >    IMPRESSION:  HEAD CT: Mild volume loss, microvascular disease, no midline shift.  Tiny focus of hyperattenuation in the left caudate head nucleus.   Suspected cavernoma. Cannot exclude tiny hemorrhage. MRI with gadolinium   may be helpful for further evaluation, if clinically indicated.  Encephalomalacia and gliosis in the right occipital lobe.    < end of copied text >  < from: CT Head No Cont (01.13.24 @ 21:04) >  The calvarium and skull base appear within normal limits.    IMPRESSION:    Small hyperdense focus in the left caudate nucleus head remains stable.   It may be further assessed and characterized with contrast-enhanced MRI   imaging, if clinically indicated.    < end of copied text >  < from: CT Head No Cont (01.16.24 @ 12:47) >    IMPRESSION:  Left caudate punctate focus of hyperattenuation is less conspicuous on   the current exam. Otherwise, no interval change.    < end of copied text >      EEG Classification / Summary:  Abnormal EEG study  Mild shifting generalized background slowing, GRDA    -----------------------------------------------------------------------------------------------------    Clinical Impression:  Mild diffuse/multi-focal cerebral dysfunction, not specific as to etiology.  There were no epileptiform abnormalities recorded.      --------------------------------------------------------------------------    Summary:   1. Technically difficult study.   2. Normal global left ventricular systolic function.   3. Left ventricular ejection fraction, by visual estimation, is 50 to   55%.   4. Spectral Doppler shows impaired relaxation pattern of left   ventricular myocardial filling (Grade I diastolic dysfunction).   5. The left atrium is normal in size.   6. Bubble study completred without evidence of inter-atrial flow.   7. Structurally normal mitral valve, with normal leaflet excursion.   8. Structurally normal tricuspid valve, with normal leaflet excursion.

## 2024-01-17 NOTE — PROGRESS NOTE ADULT - SUBJECTIVE AND OBJECTIVE BOX
Patient is a 58y old  Female who presents with a chief complaint of ALTERED MENTAL STATUS     (16 Jan 2024 11:41)      INTERVAL HPI/OVERNIGHT EVENTS: Patient seen and examined bedside.   no overnight event  states feels unsteady and lightheaded when she gets up to walk     REVIEW OF SYSTEMS: remaining ROS negative     MEDICATIONS  (STANDING):  atorvastatin 80 milliGRAM(s) Oral at bedtime  dextrose 5%. 1000 milliLiter(s) (50 mL/Hr) IV Continuous <Continuous>  dextrose 5%. 1000 milliLiter(s) (100 mL/Hr) IV Continuous <Continuous>  dextrose 50% Injectable 25 Gram(s) IV Push once  dextrose 50% Injectable 25 Gram(s) IV Push once  dextrose 50% Injectable 12.5 Gram(s) IV Push once  gabapentin 100 milliGRAM(s) Oral daily  glucagon  Injectable 1 milliGRAM(s) IntraMuscular once  influenza   Vaccine 0.5 milliLiter(s) IntraMuscular once  insulin glargine Injectable (LANTUS) 56 Unit(s) SubCutaneous at bedtime  insulin lispro (ADMELOG) corrective regimen sliding scale   SubCutaneous three times a day before meals  insulin lispro (ADMELOG) corrective regimen sliding scale   SubCutaneous at bedtime  insulin lispro Injectable (ADMELOG) 16 Unit(s) SubCutaneous three times a day before meals  labetalol 200 milliGRAM(s) Oral two times a day  levETIRAcetam 500 milliGRAM(s) Oral two times a day    MEDICATIONS  (PRN):  acetaminophen     Tablet .. 650 milliGRAM(s) Oral every 6 hours PRN Temp greater or equal to 38C (100.4F), Mild Pain (1 - 3)  aluminum hydroxide/magnesium hydroxide/simethicone Suspension 30 milliLiter(s) Oral every 4 hours PRN Dyspepsia  dextrose Oral Gel 15 Gram(s) Oral once PRN Blood Glucose LESS THAN 70 milliGRAM(s)/deciliter  melatonin 3 milliGRAM(s) Oral at bedtime PRN Insomnia  ondansetron Injectable 4 milliGRAM(s) IV Push every 8 hours PRN Nausea and/or Vomiting      Allergies    No Known Allergies    Intolerances        Vital Signs Last 24 Hrs  T(C): 36.8 (17 Jan 2024 17:03), Max: 36.9 (17 Jan 2024 12:18)  T(F): 98.3 (17 Jan 2024 17:03), Max: 98.5 (17 Jan 2024 12:18)  HR: 82 (17 Jan 2024 17:03) (74 - 82)  BP: 167/92 (17 Jan 2024 12:18) (165/89 - 177/96)  BP(mean): --  RR: 18 (17 Jan 2024 17:03) (18 - 19)  SpO2: 94% (17 Jan 2024 17:03) (93% - 95%)    Parameters below as of 17 Jan 2024 17:03  Patient On (Oxygen Delivery Method): room air            PHYSICAL EXAM:  GENERAL: NAD, no increased WOB, obese   HEAD:  Atraumatic, Normocephalic  EYES: EOMI, PERRLA, conjunctiva and sclera clear  ENMT: No tonsillar erythema, exudates, or enlargement; Moist mucous membranes,   NECK: Supple, No JVD,   NERVOUS SYSTEM:  Alert & Oriented X3, Good concentration;  nonfocal   CHEST/LUNG: CTAB;  No rales, rhonchi, wheezing, or rubs  HEART: Regular rate and rhythm; No murmurs, rubs, or gallops  ABDOMEN: Soft, Nontender, Nondistended; Bowel sounds present  EXTREMITIES:  2+ Peripheral Pulses b/l, No clubbing, cyanosis, calf tenderness or edema b/l          LABS:                                   13.2   5.31  )-----------( 198      ( 16 Jan 2024 07:17 )             39.3   01-16    139  |  107  |  21  ----------------------------<  267<H>  4.0   |  29  |  0.75    Ca    8.7      16 Jan 2024 07:17            Urinalysis Basic - ( 16 Jan 2024 07:17 )    Color: x / Appearance: x / SG: x / pH: x  Gluc: 267 mg/dL / Ketone: x  / Bili: x / Urobili: x   Blood: x / Protein: x / Nitrite: x   Leuk Esterase: x / RBC: x / WBC x   Sq Epi: x / Non Sq Epi: x / Bacteria: x          RADIOLOGY & ADDITIONAL TESTS:    Consultant(s) Notes Reveiwed [ x] Yes     Care Discussed with [x ] Consultants  [x ] Patient  [ ] Family  [ x] /   [x ] Other; RN

## 2024-01-18 LAB
GLUCOSE BLDC GLUCOMTR-MCNC: 147 MG/DL — HIGH (ref 70–99)
GLUCOSE BLDC GLUCOMTR-MCNC: 148 MG/DL — HIGH (ref 70–99)
GLUCOSE BLDC GLUCOMTR-MCNC: 213 MG/DL — HIGH (ref 70–99)
GLUCOSE BLDC GLUCOMTR-MCNC: 256 MG/DL — HIGH (ref 70–99)

## 2024-01-18 PROCEDURE — 99232 SBSQ HOSP IP/OBS MODERATE 35: CPT

## 2024-01-18 RX ORDER — SENNA PLUS 8.6 MG/1
2 TABLET ORAL AT BEDTIME
Refills: 0 | Status: DISCONTINUED | OUTPATIENT
Start: 2024-01-18 | End: 2024-01-19

## 2024-01-18 RX ORDER — BENZOCAINE AND MENTHOL 5; 1 G/100ML; G/100ML
1 LIQUID ORAL THREE TIMES A DAY
Refills: 0 | Status: DISCONTINUED | OUTPATIENT
Start: 2024-01-18 | End: 2024-01-19

## 2024-01-18 RX ORDER — METFORMIN HYDROCHLORIDE 850 MG/1
500 TABLET ORAL
Refills: 0 | Status: DISCONTINUED | OUTPATIENT
Start: 2024-01-18 | End: 2024-01-19

## 2024-01-18 RX ORDER — INSULIN LISPRO 100/ML
17 VIAL (ML) SUBCUTANEOUS
Refills: 0 | Status: DISCONTINUED | OUTPATIENT
Start: 2024-01-18 | End: 2024-01-19

## 2024-01-18 RX ORDER — INSULIN GLARGINE 100 [IU]/ML
45 INJECTION, SOLUTION SUBCUTANEOUS AT BEDTIME
Refills: 0 | Status: DISCONTINUED | OUTPATIENT
Start: 2024-01-18 | End: 2024-01-19

## 2024-01-18 RX ORDER — INSULIN LISPRO 100/ML
15 VIAL (ML) SUBCUTANEOUS
Refills: 0 | Status: DISCONTINUED | OUTPATIENT
Start: 2024-01-18 | End: 2024-01-18

## 2024-01-18 RX ADMIN — INSULIN GLARGINE 45 UNIT(S): 100 INJECTION, SOLUTION SUBCUTANEOUS at 21:57

## 2024-01-18 RX ADMIN — Medication 15 UNIT(S): at 12:05

## 2024-01-18 RX ADMIN — Medication 1 PATCH: at 07:50

## 2024-01-18 RX ADMIN — Medication 300 MILLIGRAM(S): at 08:19

## 2024-01-18 RX ADMIN — Medication 6: at 12:05

## 2024-01-18 RX ADMIN — SENNA PLUS 2 TABLET(S): 8.6 TABLET ORAL at 21:56

## 2024-01-18 RX ADMIN — BENZOCAINE AND MENTHOL 1 LOZENGE: 5; 1 LIQUID ORAL at 06:41

## 2024-01-18 RX ADMIN — ATORVASTATIN CALCIUM 80 MILLIGRAM(S): 80 TABLET, FILM COATED ORAL at 21:56

## 2024-01-18 RX ADMIN — GABAPENTIN 100 MILLIGRAM(S): 400 CAPSULE ORAL at 12:06

## 2024-01-18 RX ADMIN — BENZOCAINE AND MENTHOL 1 LOZENGE: 5; 1 LIQUID ORAL at 18:26

## 2024-01-18 RX ADMIN — Medication 15 UNIT(S): at 08:14

## 2024-01-18 RX ADMIN — Medication 300 MILLIGRAM(S): at 17:00

## 2024-01-18 RX ADMIN — Medication 1 PATCH: at 19:22

## 2024-01-18 RX ADMIN — Medication 4: at 08:14

## 2024-01-18 RX ADMIN — Medication 81 MILLIGRAM(S): at 12:06

## 2024-01-18 RX ADMIN — AMLODIPINE BESYLATE 10 MILLIGRAM(S): 2.5 TABLET ORAL at 05:43

## 2024-01-18 RX ADMIN — ENOXAPARIN SODIUM 40 MILLIGRAM(S): 100 INJECTION SUBCUTANEOUS at 08:18

## 2024-01-18 RX ADMIN — Medication 17 UNIT(S): at 16:40

## 2024-01-18 NOTE — PROGRESS NOTE ADULT - SUBJECTIVE AND OBJECTIVE BOX
Patient is a 58y old  Female who presents with a chief complaint of Suspected CVA (18 Jan 2024 16:05)      Interval History: on Lantus 45 units now and prandial lispro 17 units   also on Metformin 500 mg BID   finger sticks are in 100s now     MEDICATIONS  (STANDING):  amLODIPine   Tablet 10 milliGRAM(s) Oral daily  aspirin enteric coated 81 milliGRAM(s) Oral daily  atorvastatin 80 milliGRAM(s) Oral at bedtime  cloNIDine Patch 0.3 mG/24Hr(s) 1 patch Transdermal every 7 days  dextrose 5%. 1000 milliLiter(s) (50 mL/Hr) IV Continuous <Continuous>  dextrose 5%. 1000 milliLiter(s) (100 mL/Hr) IV Continuous <Continuous>  dextrose 50% Injectable 25 Gram(s) IV Push once  dextrose 50% Injectable 25 Gram(s) IV Push once  dextrose 50% Injectable 12.5 Gram(s) IV Push once  enoxaparin Injectable 40 milliGRAM(s) SubCutaneous every 24 hours  gabapentin 100 milliGRAM(s) Oral daily  glucagon  Injectable 1 milliGRAM(s) IntraMuscular once  influenza   Vaccine 0.5 milliLiter(s) IntraMuscular once  insulin glargine Injectable (LANTUS) 45 Unit(s) SubCutaneous at bedtime  insulin lispro (ADMELOG) corrective regimen sliding scale   SubCutaneous three times a day before meals  insulin lispro (ADMELOG) corrective regimen sliding scale   SubCutaneous at bedtime  insulin lispro Injectable (ADMELOG) 17 Unit(s) SubCutaneous three times a day before meals  labetalol 300 milliGRAM(s) Oral two times a day  metFORMIN 500 milliGRAM(s) Oral two times a day  senna 2 Tablet(s) Oral at bedtime    MEDICATIONS  (PRN):  acetaminophen     Tablet .. 650 milliGRAM(s) Oral every 6 hours PRN Temp greater or equal to 38C (100.4F), Mild Pain (1 - 3)  aluminum hydroxide/magnesium hydroxide/simethicone Suspension 30 milliLiter(s) Oral every 4 hours PRN Dyspepsia  benzocaine/menthol Lozenge 1 Lozenge Oral three times a day PRN Sore Throat  dextrose Oral Gel 15 Gram(s) Oral once PRN Blood Glucose LESS THAN 70 milliGRAM(s)/deciliter  hydrALAZINE Injectable 10 milliGRAM(s) IV Push every 6 hours PRN if SBP >170 or DBP >100  melatonin 3 milliGRAM(s) Oral at bedtime PRN Insomnia  ondansetron Injectable 4 milliGRAM(s) IV Push every 8 hours PRN Nausea and/or Vomiting      Allergies    No Known Allergies    Intolerances        REVIEW OF SYSTEMS:  CONSTITUTIONAL: no changes  EYES: No eye pain, visual disturbances, or discharge  ENMT:  No difficulty hearing, No sinus or throat pain  NECK: No pain or stiffness  RESPIRATORY: No cough, wheezing, chills or hemoptysis; No shortness of breath  CARDIOVASCULAR: No chest pain, palpitations or leg swelling  GASTROINTESTINAL: No abdominal or epigastric pain. No nausea, vomiting, or hematemesis; No diarrhea or constipation. No melena or hematochezia.  GENITOURINARY: No dysuria, frequency, hematuria, or incontinence  NEUROLOGICAL: No headaches, memory loss, loss of strength, numbness, or tremors  SKIN: No itching, burning, rashes, or lesions   ENDOCRINE: No heat or cold intolerance; No hair loss  MUSCULOSKELETAL: No joint pain or swelling; No muscle, back, or extremity pain  PSYCHIATRIC: No depression, anxiety, mood swings, or difficulty sleeping  HEME/LYMPH: No easy bruising, or bleeding gums  ALLERY AND IMMUNOLOGIC: No hives or eczema    Vital Signs Last 24 Hrs  T(C): 37.2 (18 Jan 2024 17:00), Max: 37.2 (18 Jan 2024 17:00)  T(F): 98.9 (18 Jan 2024 17:00), Max: 98.9 (18 Jan 2024 17:00)  HR: 86 (18 Jan 2024 17:53) (79 - 88)  BP: 165/91 (18 Jan 2024 17:53) (132/88 - 180/91)  BP(mean): 115 (18 Jan 2024 17:53) (115 - 117)  RR: 18 (18 Jan 2024 17:00) (16 - 18)  SpO2: 96% (18 Jan 2024 17:00) (93% - 96%)    Parameters below as of 18 Jan 2024 17:00  Patient On (Oxygen Delivery Method): room air        PHYSICAL EXAM:  GENERAL:   HEAD: Atraumatic, Normocephalic  EYES: PERRLA, conjunctiva and sclera clear  ENMT: No  exudates,; Moist mucous membranes,, No lesions  NECK: Supple, No JVD, Normal thyroid  NERVOUS SYSTEM:  Alert & Oriented,   CHEST/LUNG: Clear to auscultation bilaterally; No rales, rhonchi, wheezing, or rubs  HEART: Regular rate and rhythm; No murmurs, rubs, or gallops  ABDOMEN: Soft, Nontender, Nondistended; Bowel sounds present  EXTREMITIES:  2+ Peripheral Pulses, no edema  SKIN: No rashes or lesions    LABS:        CAPILLARY BLOOD GLUCOSE      POCT Blood Glucose.: 147 mg/dL (18 Jan 2024 21:40)  POCT Blood Glucose.: 148 mg/dL (18 Jan 2024 16:26)  POCT Blood Glucose.: 256 mg/dL (18 Jan 2024 11:10)  POCT Blood Glucose.: 213 mg/dL (18 Jan 2024 07:41)    Lipid panel:           Thyroid:  Diabetes Tests:  Parathyroid Panel:  Adrenals:  RADIOLOGY & ADDITIONAL TESTS:    Imaging Personally Reviewed:  [ ] YES  [ ] NO    Consultant(s) Notes Reviewed:  [ ] YES  [ ] NO    Care Discussed with Consultants/Other Providers [ ] YES  [ ] NO

## 2024-01-18 NOTE — PROGRESS NOTE ADULT - TIME BILLING
min spent reviewing patient's chart,  examining patient, discussing plan with patient and family and staff, reviewing consultant recommendations/communicating with consultants, writing progress note and placing orders.
- Ordering, reviewing, and interpreting labs, testing, and imaging.  - Independently obtaining a review of systems and performing a physical exam  - Reviewing prior hospitalization and where necessary, outpatient records.  - Reviewing consultant recommendations/communicating with consultants  - Counselling and educating patient and family regarding interpretation of aforementioned items and plan of care.
min spent reviewing patient's chart,  examining patient, discussing plan with patient and family and staff, reviewing consultant recommendations/communicating with consultants, writing progress note and placing orders.
- Ordering, reviewing, and interpreting labs, testing, and imaging.  - Independently obtaining a review of systems and performing a physical exam  - Reviewing prior hospitalization and where necessary, outpatient records.  - Reviewing consultant recommendations/communicating with consultants  - Counselling and educating patient and family regarding interpretation of aforementioned items and plan of care.

## 2024-01-18 NOTE — OCCUPATIONAL THERAPY INITIAL EVALUATION ADULT - GENERAL OBSERVATIONS, REHAB EVAL
Pt was seen for initial OT consult, encountered in bed in NAD. IV heplock is in place. Grade 1+ edema is noted in LLE. Pt was AA&Ox4, cooperative & followed commands. Pt moves all extremitites without difficulties and integrates both sides of her body. Pt denied pain numbness or tingling. Pt reported multiple falls in the past 3-6 months due to sporadic pain and buckling in her right knee ; this limits pt's activity tolerance ,balance, ADL management and functional mobility

## 2024-01-18 NOTE — PROGRESS NOTE ADULT - ASSESSMENT
58F with HTN, HLD, IDDM2, and hx of stroke with residual R hemiparesis and speech difficulties here with RUE weakness and disorientation, thought to be related to hyperglycemia as  and UA with proteinuria and glucosuria  Stroke code called in ED but NIHSS deemed to be 0. CTH with hyperdensity in the L caudate head that appeared stable on 6h scan. CTH also remarkable for chronic R PCA, L basal ganglia and L  infarcts. Repeat CTH 1/16 unchanged  EEG with diffuse slowing, no sz  , a1c 13.4    Suspected CVA vs symptoms in setting of hypertensive urgency POA   Worsening of chronic R hemiparesis possibly stroke mimic in the setting of hyperglycemia,  symptoms resolved after BP improved   Empirically started on levetiracetam 500 mg BID x 7 days for seizure prophylaxis as per neurosurgery>>stopped   ECHO: pEF, grade 1 DD, bubble study without evidence of inter-atrial flow.  neurology following   patient unable to fit into MRI machine , will need outpatient open MRI/MRA ; prolonged cardiac monitoring as outpatient  continue with ASA, high intensity statin   PT: outpatient PT     elevated troponin , likely in setting of CVA vs demand in setting of hypertensive urgency POA   no e/o ACS   BP in triage 192/128    Uncontrolled IDDM2 with hyperglycemia  A1C 13% ; CM spoke with patient's pharmacy, patient last filled insulin and seen her PMD in 11/2023 ; has not been back since   Blood glucose 532 on admission, bicarb and anion gap WNL, U/A with glucosuria  S/p 1L NS bolus and insulin 5 U IV in the ED  Endo consulted appreciated   continue with lantus 45u QHS, lispro 15u ac  - increased lispro to 17 units pre meal   added metformin 500mg bid - states she would not want to take on discharge given diarrhea.   [ ] On discharge, patient states would need refill of all insulin     dizziness  follow orthostatics     HTN:  continue with current meds     HLD: PTA atorvastatin 80 mg daily     Preventative Measures   lovenox SQ-dvt ppx  fall precautions   Dispo; 1/19 pending improved FS and final insulin recs

## 2024-01-18 NOTE — PROGRESS NOTE ADULT - ASSESSMENT
58F with HTN, HLD, IDDM2, and hx of stroke with residual R hemiparesis and speech difficulties here with RUE weakness and disorientation, thought to be related to hyperglycemia as  and UA with proteinuria and glucosuria  Stroke code called in ED but NIHSS deemed to be 0. CTH with hyperdensity in the L caudate head that appeared stable on 6h scan. CTH also remarkable for chronic R PCA, L basal ganglia and L  infarcts. Repeat CTH 1/16 unchanged  EEG with diffuse slowing, no sz  , a1c 13.4    Worsening of chronic R hemiparesis likely stroke mimic in the setting of hyperglycemia, r/o new ischemic event. CTH appears more consistent with cavernoma vs calcification than ICH  Chronic embolic and small vessel appearing infarcts    Plan:  - start aspirin 81   - unable to fit in MRI machine. Will get outpatient open MRI/A  - atorvastatin 80mg  - endocrine eval for A1C, blood glucose control  - bp goal normotension  - tte reviewed   - prolonged cardiac monitoring as outpatient  - can consider routine eeg but can likely hold off for now - reviewed  - pt/ot  - dvt ppx  dc planning    Samantha Everett DO  Vascular Neurology  Office [phone]

## 2024-01-18 NOTE — PROGRESS NOTE ADULT - SUBJECTIVE AND OBJECTIVE BOX
[facility]  Division of Hospital Medicine  Jolene De lRio MD  Pager: [phone]      Patient is a 58y old  Female who presents with a chief complaint of Suspected CVA (18 Jan 2024 14:25)      SUBJECTIVE / OVERNIGHT EVENTS: Patient seen and examined at bedside. Eating lunch. Reports feeling okay. No chest pain, sob, or weakness. On discharge, patient states would need refill of all insulin   ADDITIONAL REVIEW OF SYSTEMS:    MEDICATIONS  (STANDING):  amLODIPine   Tablet 10 milliGRAM(s) Oral daily  aspirin enteric coated 81 milliGRAM(s) Oral daily  atorvastatin 80 milliGRAM(s) Oral at bedtime  cloNIDine Patch 0.3 mG/24Hr(s) 1 patch Transdermal every 7 days  dextrose 5%. 1000 milliLiter(s) (50 mL/Hr) IV Continuous <Continuous>  dextrose 5%. 1000 milliLiter(s) (100 mL/Hr) IV Continuous <Continuous>  dextrose 50% Injectable 25 Gram(s) IV Push once  dextrose 50% Injectable 12.5 Gram(s) IV Push once  dextrose 50% Injectable 25 Gram(s) IV Push once  enoxaparin Injectable 40 milliGRAM(s) SubCutaneous every 24 hours  gabapentin 100 milliGRAM(s) Oral daily  glucagon  Injectable 1 milliGRAM(s) IntraMuscular once  influenza   Vaccine 0.5 milliLiter(s) IntraMuscular once  insulin glargine Injectable (LANTUS) 45 Unit(s) SubCutaneous at bedtime  insulin lispro (ADMELOG) corrective regimen sliding scale   SubCutaneous three times a day before meals  insulin lispro (ADMELOG) corrective regimen sliding scale   SubCutaneous at bedtime  insulin lispro Injectable (ADMELOG) 17 Unit(s) SubCutaneous three times a day before meals  labetalol 300 milliGRAM(s) Oral two times a day  metFORMIN 500 milliGRAM(s) Oral two times a day  senna 2 Tablet(s) Oral at bedtime    MEDICATIONS  (PRN):  acetaminophen     Tablet .. 650 milliGRAM(s) Oral every 6 hours PRN Temp greater or equal to 38C (100.4F), Mild Pain (1 - 3)  aluminum hydroxide/magnesium hydroxide/simethicone Suspension 30 milliLiter(s) Oral every 4 hours PRN Dyspepsia  benzocaine/menthol Lozenge 1 Lozenge Oral three times a day PRN Sore Throat  dextrose Oral Gel 15 Gram(s) Oral once PRN Blood Glucose LESS THAN 70 milliGRAM(s)/deciliter  hydrALAZINE Injectable 10 milliGRAM(s) IV Push every 6 hours PRN if SBP >170 or DBP >100  melatonin 3 milliGRAM(s) Oral at bedtime PRN Insomnia  ondansetron Injectable 4 milliGRAM(s) IV Push every 8 hours PRN Nausea and/or Vomiting      CAPILLARY BLOOD GLUCOSE      POCT Blood Glucose.: 256 mg/dL (18 Jan 2024 11:10)  POCT Blood Glucose.: 213 mg/dL (18 Jan 2024 07:41)  POCT Blood Glucose.: 187 mg/dL (17 Jan 2024 21:44)  POCT Blood Glucose.: 206 mg/dL (17 Jan 2024 16:49)    I&O's Summary      PHYSICAL EXAM:  Vital Signs Last 24 Hrs  T(C): 37.1 (18 Jan 2024 10:57), Max: 37.1 (18 Jan 2024 05:33)  T(F): 98.7 (18 Jan 2024 10:57), Max: 98.8 (18 Jan 2024 05:33)  HR: 82 (18 Jan 2024 10:57) (79 - 88)  BP: 132/88 (18 Jan 2024 10:57) (132/88 - 170/93)  BP(mean): 117 (18 Jan 2024 08:19) (117 - 117)  RR: 18 (18 Jan 2024 10:57) (16 - 18)  SpO2: 95% (18 Jan 2024 10:57) (93% - 96%)    Parameters below as of 18 Jan 2024 10:57  Patient On (Oxygen Delivery Method): room air      GENERAL: NAD, no increased WOB, obese   HEAD:  Atraumatic, Normocephalic  EYES: EOMI, PERRLA, conjunctiva and sclera clear  ENMT: No tonsillar erythema, exudates, or enlargement; Moist mucous membranes,   NECK: Supple, No JVD,   NERVOUS SYSTEM:  Alert & Oriented X3, Good concentration;  nonfocal   CHEST/LUNG: CTAB;  No rales, rhonchi, wheezing, or rubs  HEART: Regular rate and rhythm; No murmurs, rubs, or gallops  ABDOMEN: Soft, Nontender, Nondistended; Bowel sounds present  EXTREMITIES:  2+ Peripheral Pulses b/l, No clubbing, cyanosis, calf tenderness or edema b/l   LABS:                      RADIOLOGY & ADDITIONAL TESTS:  Results Reviewed:   Imaging Personally Reviewed:  Electrocardiogram Personally Reviewed:    COORDINATION OF CARE:  Care Discussed with Consultants/Other Providers [Y/N]:  Prior or Outpatient Records Reviewed [Y/N]:

## 2024-01-18 NOTE — OCCUPATIONAL THERAPY INITIAL EVALUATION ADULT - NSOTDISCHREC_GEN_A_CORE
to prevent falls, optimize pt's ability for ADL management & safely navigate in all terrains/Outpatient OT

## 2024-01-18 NOTE — OCCUPATIONAL THERAPY INITIAL EVALUATION ADULT - SOCIAL CONCERNS
Pt voiced concerns about the multiple fall she has had and the impact on her ability to works/Complex psychosocial needs/coping issues

## 2024-01-18 NOTE — OCCUPATIONAL THERAPY INITIAL EVALUATION ADULT - ADDITIONAL COMMENTS
Prior to admission, pt was functioning in her roles, self sufficient & ambulating independently without any assistive devices. Pt utilizes a SAC as needed when she experiences exacerbation of right knee pain.   Pt uses access ride to get to her appointments and place of employment. Presently pt  is still functioning at baseline. Pt is right hand dominant and wears glasses for reading.

## 2024-01-18 NOTE — PROGRESS NOTE ADULT - SUBJECTIVE AND OBJECTIVE BOX
Neurology Progress Note    S: Patient seen and examined.     Medications: MEDICATIONS  (STANDING):  amLODIPine   Tablet 10 milliGRAM(s) Oral daily  aspirin enteric coated 81 milliGRAM(s) Oral daily  atorvastatin 80 milliGRAM(s) Oral at bedtime  cloNIDine Patch 0.3 mG/24Hr(s) 1 patch Transdermal every 7 days  dextrose 5%. 1000 milliLiter(s) (50 mL/Hr) IV Continuous <Continuous>  dextrose 5%. 1000 milliLiter(s) (100 mL/Hr) IV Continuous <Continuous>  dextrose 50% Injectable 25 Gram(s) IV Push once  dextrose 50% Injectable 25 Gram(s) IV Push once  dextrose 50% Injectable 12.5 Gram(s) IV Push once  enoxaparin Injectable 40 milliGRAM(s) SubCutaneous every 24 hours  gabapentin 100 milliGRAM(s) Oral daily  glucagon  Injectable 1 milliGRAM(s) IntraMuscular once  influenza   Vaccine 0.5 milliLiter(s) IntraMuscular once  insulin glargine Injectable (LANTUS) 45 Unit(s) SubCutaneous at bedtime  insulin lispro (ADMELOG) corrective regimen sliding scale   SubCutaneous three times a day before meals  insulin lispro (ADMELOG) corrective regimen sliding scale   SubCutaneous at bedtime  insulin lispro Injectable (ADMELOG) 15 Unit(s) SubCutaneous three times a day before meals  labetalol 300 milliGRAM(s) Oral two times a day  metFORMIN 500 milliGRAM(s) Oral two times a day  senna 2 Tablet(s) Oral at bedtime    MEDICATIONS  (PRN):  acetaminophen     Tablet .. 650 milliGRAM(s) Oral every 6 hours PRN Temp greater or equal to 38C (100.4F), Mild Pain (1 - 3)  aluminum hydroxide/magnesium hydroxide/simethicone Suspension 30 milliLiter(s) Oral every 4 hours PRN Dyspepsia  benzocaine/menthol Lozenge 1 Lozenge Oral three times a day PRN Sore Throat  dextrose Oral Gel 15 Gram(s) Oral once PRN Blood Glucose LESS THAN 70 milliGRAM(s)/deciliter  hydrALAZINE Injectable 10 milliGRAM(s) IV Push every 6 hours PRN if SBP >170 or DBP >100  melatonin 3 milliGRAM(s) Oral at bedtime PRN Insomnia  ondansetron Injectable 4 milliGRAM(s) IV Push every 8 hours PRN Nausea and/or Vomiting       Vitals:  Vital Signs Last 24 Hrs  T(C): 37.1 (18 Jan 2024 10:57), Max: 37.1 (18 Jan 2024 05:33)  T(F): 98.7 (18 Jan 2024 10:57), Max: 98.8 (18 Jan 2024 05:33)  HR: 82 (18 Jan 2024 10:57) (79 - 88)  BP: 132/88 (18 Jan 2024 10:57) (132/88 - 170/93)  BP(mean): 117 (18 Jan 2024 08:19) (117 - 117)  RR: 18 (18 Jan 2024 10:57) (16 - 18)  SpO2: 95% (18 Jan 2024 10:57) (93% - 96%)    Parameters below as of 18 Jan 2024 10:57  Patient On (Oxygen Delivery Method): room air          General Exam:   General Appearance: Appropriately dressed and in no acute distress       Head: Normocephalic, atraumatic and no dysmorphic features  Ear, Nose, and Throat: Moist mucous membranes  CVS: S1S2+  Resp: No SOB, no wheeze or rhonchi  Abd: soft NTND  Extremities: No edema, no cyanosis  Skin: No bruises, no rashes     Neurological Exam:  Mental Status: Awake, alert and oriented x 3.  Able to follow simple and complex verbal commands. Able to name and repeat. speech is dysfluent with occasional word finding difficulty.   Cranial Nerves: PERRL, EOMI,  ? L sup quadrantopia, sensation V1-V3 intact,  no obvious facial asymmetry, equal elevation of palate, scm/trap 5/5, tongue is midline on protrusion.  hearing is grossly intact.   Motor: RUE and RLE drift - RLE from arthritic pain   Sensation: Intact to light touch and pinprick throughout. no right/left confusion. no extinction to tactile on DSS.   Reflexes: 1+ throughout at biceps, brachioradialis, triceps, patellars and ankles bilaterally and equal. No clonus. R toe and L toe were both downgoing.  Coordination: No dysmetria on FNF  Gait:  deferred      LABS:    no new labs      I personally reviewed the below data/images/labs:  < from: CT Brain Stroke Protocol (01.13.24 @ 15:42) >    IMPRESSION:  HEAD CT: Mild volume loss, microvascular disease, no midline shift.  Tiny focus of hyperattenuation in the left caudate head nucleus.   Suspected cavernoma. Cannot exclude tiny hemorrhage. MRI with gadolinium   may be helpful for further evaluation, if clinically indicated.  Encephalomalacia and gliosis in the right occipital lobe.    < end of copied text >  < from: CT Head No Cont (01.13.24 @ 21:04) >  The calvarium and skull base appear within normal limits.    IMPRESSION:    Small hyperdense focus in the left caudate nucleus head remains stable.   It may be further assessed and characterized with contrast-enhanced MRI   imaging, if clinically indicated.    < end of copied text >  < from: CT Head No Cont (01.16.24 @ 12:47) >    IMPRESSION:  Left caudate punctate focus of hyperattenuation is less conspicuous on   the current exam. Otherwise, no interval change.    < end of copied text >      EEG Classification / Summary:  Abnormal EEG study  Mild shifting generalized background slowing, GRDA    -----------------------------------------------------------------------------------------------------    Clinical Impression:  Mild diffuse/multi-focal cerebral dysfunction, not specific as to etiology.  There were no epileptiform abnormalities recorded.      --------------------------------------------------------------------------    Summary:   1. Technically difficult study.   2. Normal global left ventricular systolic function.   3. Left ventricular ejection fraction, by visual estimation, is 50 to   55%.   4. Spectral Doppler shows impaired relaxation pattern of left   ventricular myocardial filling (Grade I diastolic dysfunction).   5. The left atrium is normal in size.   6. Bubble study completred without evidence of inter-atrial flow.   7. Structurally normal mitral valve, with normal leaflet excursion.   8. Structurally normal tricuspid valve, with normal leaflet excursion.

## 2024-01-18 NOTE — OCCUPATIONAL THERAPY INITIAL EVALUATION ADULT - PERTINENT HX OF CURRENT PROBLEM, REHAB EVAL
Pt is a 57 y/o female who presented to ER on due to acute onset of neurological deficit. Pt has PMHx of HTN, HLD, IDDM2, and hx of TIA .Pt is diagnosed with altered mental status. Head CT on 1/16/24  results confirm Left caudate punctate focus of hyperattenuation

## 2024-01-18 NOTE — OCCUPATIONAL THERAPY INITIAL EVALUATION ADULT - LIVES WITH, PROFILE
her son in a private house with 15 entry steps equipped  equipped with bilateral hand rails that can be reached simultaneously . Once inside, pt has to negotiate a flight of stairs 15 steps, with left descending handrail, to access the  laundry room in the basement. Otherwise, all living amenities are located on one level. The bathroom has a tub/shower combination , shower bench and comfort height  toilet.

## 2024-01-19 ENCOUNTER — TRANSCRIPTION ENCOUNTER (OUTPATIENT)
Age: 59
End: 2024-01-19

## 2024-01-19 VITALS — SYSTOLIC BLOOD PRESSURE: 154 MMHG | DIASTOLIC BLOOD PRESSURE: 87 MMHG | HEART RATE: 75 BPM

## 2024-01-19 LAB
GLUCOSE BLDC GLUCOMTR-MCNC: 159 MG/DL — HIGH (ref 70–99)
GLUCOSE BLDC GLUCOMTR-MCNC: 226 MG/DL — HIGH (ref 70–99)
GLUCOSE BLDC GLUCOMTR-MCNC: 234 MG/DL — HIGH (ref 70–99)

## 2024-01-19 PROCEDURE — 99239 HOSP IP/OBS DSCHRG MGMT >30: CPT

## 2024-01-19 RX ORDER — ATORVASTATIN CALCIUM 80 MG/1
1 TABLET, FILM COATED ORAL
Qty: 90 | Refills: 0
Start: 2024-01-19 | End: 2024-04-17

## 2024-01-19 RX ORDER — SENNA PLUS 8.6 MG/1
2 TABLET ORAL
Qty: 0 | Refills: 0 | DISCHARGE
Start: 2024-01-19

## 2024-01-19 RX ORDER — INSULIN GLARGINE 100 [IU]/ML
52 INJECTION, SOLUTION SUBCUTANEOUS
Refills: 0 | DISCHARGE

## 2024-01-19 RX ORDER — ASPIRIN/CALCIUM CARB/MAGNESIUM 324 MG
1 TABLET ORAL
Qty: 30 | Refills: 0
Start: 2024-01-19 | End: 2024-02-17

## 2024-01-19 RX ORDER — ISOPROPYL ALCOHOL, BENZOCAINE .7; .06 ML/ML; ML/ML
0 SWAB TOPICAL
Qty: 100 | Refills: 1
Start: 2024-01-19

## 2024-01-19 RX ORDER — AMLODIPINE BESYLATE 2.5 MG/1
1 TABLET ORAL
Qty: 90 | Refills: 0
Start: 2024-01-19 | End: 2024-04-17

## 2024-01-19 RX ORDER — INSULIN ASPART 100 [IU]/ML
17 INJECTION, SOLUTION SUBCUTANEOUS
Qty: 9 | Refills: 0
Start: 2024-01-19 | End: 2024-04-17

## 2024-01-19 RX ORDER — GLIMEPIRIDE 1 MG
1 TABLET ORAL
Qty: 90 | Refills: 0
Start: 2024-01-19 | End: 2024-04-17

## 2024-01-19 RX ORDER — INSULIN GLARGINE 100 [IU]/ML
45 INJECTION, SOLUTION SUBCUTANEOUS
Qty: 9 | Refills: 0
Start: 2024-01-19 | End: 2024-04-17

## 2024-01-19 RX ORDER — LABETALOL HCL 100 MG
1 TABLET ORAL
Qty: 180 | Refills: 0
Start: 2024-01-19 | End: 2024-04-17

## 2024-01-19 RX ORDER — LABETALOL HCL 100 MG
1 TABLET ORAL
Refills: 0 | DISCHARGE

## 2024-01-19 RX ORDER — ATORVASTATIN CALCIUM 80 MG/1
1 TABLET, FILM COATED ORAL
Refills: 0 | DISCHARGE

## 2024-01-19 RX ADMIN — ENOXAPARIN SODIUM 40 MILLIGRAM(S): 100 INJECTION SUBCUTANEOUS at 08:10

## 2024-01-19 RX ADMIN — Medication 4: at 12:06

## 2024-01-19 RX ADMIN — Medication 300 MILLIGRAM(S): at 17:00

## 2024-01-19 RX ADMIN — BENZOCAINE AND MENTHOL 1 LOZENGE: 5; 1 LIQUID ORAL at 06:24

## 2024-01-19 RX ADMIN — Medication 4: at 16:56

## 2024-01-19 RX ADMIN — Medication 17 UNIT(S): at 16:56

## 2024-01-19 RX ADMIN — Medication 17 UNIT(S): at 08:11

## 2024-01-19 RX ADMIN — Medication 300 MILLIGRAM(S): at 05:55

## 2024-01-19 RX ADMIN — Medication 1 PATCH: at 06:57

## 2024-01-19 RX ADMIN — AMLODIPINE BESYLATE 10 MILLIGRAM(S): 2.5 TABLET ORAL at 05:55

## 2024-01-19 RX ADMIN — GABAPENTIN 100 MILLIGRAM(S): 400 CAPSULE ORAL at 12:05

## 2024-01-19 RX ADMIN — Medication 2: at 08:11

## 2024-01-19 RX ADMIN — Medication 81 MILLIGRAM(S): at 12:06

## 2024-01-19 RX ADMIN — Medication 17 UNIT(S): at 12:06

## 2024-01-19 NOTE — PROGRESS NOTE ADULT - SUBJECTIVE AND OBJECTIVE BOX
Patient is a 58y old  Female who presents with a chief complaint of Suspected CVA (19 Jan 2024 17:30)      Interval History: on Lantus 45 units and prandial lispro 17 units and Metformin BID   finger sticks are in low 200s   decreased glucose toxicity     MEDICATIONS  (STANDING):  amLODIPine   Tablet 10 milliGRAM(s) Oral daily  aspirin enteric coated 81 milliGRAM(s) Oral daily  atorvastatin 80 milliGRAM(s) Oral at bedtime  cloNIDine Patch 0.3 mG/24Hr(s) 1 patch Transdermal every 7 days  dextrose 5%. 1000 milliLiter(s) (50 mL/Hr) IV Continuous <Continuous>  dextrose 5%. 1000 milliLiter(s) (100 mL/Hr) IV Continuous <Continuous>  dextrose 50% Injectable 25 Gram(s) IV Push once  dextrose 50% Injectable 25 Gram(s) IV Push once  dextrose 50% Injectable 12.5 Gram(s) IV Push once  enoxaparin Injectable 40 milliGRAM(s) SubCutaneous every 24 hours  gabapentin 100 milliGRAM(s) Oral daily  glucagon  Injectable 1 milliGRAM(s) IntraMuscular once  influenza   Vaccine 0.5 milliLiter(s) IntraMuscular once  insulin glargine Injectable (LANTUS) 45 Unit(s) SubCutaneous at bedtime  insulin lispro (ADMELOG) corrective regimen sliding scale   SubCutaneous three times a day before meals  insulin lispro (ADMELOG) corrective regimen sliding scale   SubCutaneous at bedtime  insulin lispro Injectable (ADMELOG) 17 Unit(s) SubCutaneous three times a day before meals  labetalol 300 milliGRAM(s) Oral two times a day  metFORMIN 500 milliGRAM(s) Oral two times a day  senna 2 Tablet(s) Oral at bedtime    MEDICATIONS  (PRN):  acetaminophen     Tablet .. 650 milliGRAM(s) Oral every 6 hours PRN Temp greater or equal to 38C (100.4F), Mild Pain (1 - 3)  aluminum hydroxide/magnesium hydroxide/simethicone Suspension 30 milliLiter(s) Oral every 4 hours PRN Dyspepsia  benzocaine/menthol Lozenge 1 Lozenge Oral three times a day PRN Sore Throat  dextrose Oral Gel 15 Gram(s) Oral once PRN Blood Glucose LESS THAN 70 milliGRAM(s)/deciliter  hydrALAZINE Injectable 10 milliGRAM(s) IV Push every 6 hours PRN if SBP >170 or DBP >100  melatonin 3 milliGRAM(s) Oral at bedtime PRN Insomnia  ondansetron Injectable 4 milliGRAM(s) IV Push every 8 hours PRN Nausea and/or Vomiting      Allergies    No Known Allergies    Intolerances        REVIEW OF SYSTEMS:  CONSTITUTIONAL: no changes  EYES: No eye pain, visual disturbances, or discharge  ENMT:  No difficulty hearing, No sinus or throat pain  NECK: No pain or stiffness  RESPIRATORY: No cough, wheezing, chills or hemoptysis; No shortness of breath  CARDIOVASCULAR: No chest pain, palpitations or leg swelling  GASTROINTESTINAL: No abdominal or epigastric pain. No nausea, vomiting, or hematemesis; No diarrhea or constipation. No melena or hematochezia.  GENITOURINARY: No dysuria, frequency, hematuria, or incontinence  NEUROLOGICAL: No headaches, memory loss, loss of strength, numbness, or tremors  SKIN: No itching, burning, rashes, or lesions   ENDOCRINE: No heat or cold intolerance; No hair loss  MUSCULOSKELETAL: No joint pain or swelling; No muscle, back, or extremity pain  PSYCHIATRIC: No depression, anxiety, mood swings, or difficulty sleeping  HEME/LYMPH: No easy bruising, or bleeding gums  ALLERY AND IMMUNOLOGIC: No hives or eczema    Vital Signs Last 24 Hrs  T(C): 36.7 (19 Jan 2024 16:55), Max: 37.1 (18 Jan 2024 23:53)  T(F): 98 (19 Jan 2024 16:55), Max: 98.8 (19 Jan 2024 12:20)  HR: 75 (19 Jan 2024 18:00) (70 - 84)  BP: 154/87 (19 Jan 2024 18:00) (131/82 - 168/89)  BP(mean): --  RR: 18 (19 Jan 2024 16:55) (18 - 18)  SpO2: 96% (19 Jan 2024 16:55) (94% - 96%)    Parameters below as of 19 Jan 2024 16:55  Patient On (Oxygen Delivery Method): room air        PHYSICAL EXAM:  GENERAL:   HEAD: Atraumatic, Normocephalic  EYES: PERRLA, conjunctiva and sclera clear  ENMT: No  exudates,; Moist mucous membranes,, No lesions  NECK: Supple, No JVD, Normal thyroid  NERVOUS SYSTEM:  Alert & Oriented,   CHEST/LUNG: Clear to auscultation bilaterally; No rales, rhonchi, wheezing, or rubs  HEART: Regular rate and rhythm; No murmurs, rubs, or gallops  ABDOMEN: Soft, Nontender, Nondistended; Bowel sounds present  EXTREMITIES:  2+ Peripheral Pulses, no edema  SKIN: No rashes or lesions    LABS:        CAPILLARY BLOOD GLUCOSE      POCT Blood Glucose.: 234 mg/dL (19 Jan 2024 16:35)  POCT Blood Glucose.: 226 mg/dL (19 Jan 2024 11:28)  POCT Blood Glucose.: 159 mg/dL (19 Jan 2024 07:37)    Lipid panel:           Thyroid:  Diabetes Tests:  Parathyroid Panel:  Adrenals:  RADIOLOGY & ADDITIONAL TESTS:    Imaging Personally Reviewed:  [ ] YES  [ ] NO    Consultant(s) Notes Reviewed:  [ ] YES  [ ] NO    Care Discussed with Consultants/Other Providers [ ] YES  [ ] NO

## 2024-01-19 NOTE — DISCHARGE NOTE PROVIDER - HOSPITAL COURSE
58F with HTN, HLD, IDDM2, and hx of stroke with residual R hemiparesis and speech difficulties here with RUE weakness and disorientation, thought to be related to hyperglycemia as  and UA with proteinuria and glucosuria  Stroke code called in ED but NIHSS deemed to be 0. CTH with hyperdensity in the L caudate head that appeared stable on 6h scan. CTH also remarkable for chronic R PCA, L basal ganglia and L  infarcts. Repeat CTH 1/16 unchanged  EEG with diffuse slowing, no sz  , a1c 13.4    Vital Signs Last 24 Hrs  T(C): 36.7 (01-19-24 @ 16:55), Max: 37.1 (01-18-24 @ 23:53)  T(F): 98 (01-19-24 @ 16:55), Max: 98.8 (01-19-24 @ 12:20)  HR: 84 (01-19-24 @ 16:55) (70 - 86)  BP: 168/89 (01-19-24 @ 16:55) (131/82 - 168/89)  BP(mean): 115 (01-18-24 @ 17:53) (115 - 115)  RR: 18 (01-19-24 @ 16:55) (18 - 18)  SpO2: 96% (01-19-24 @ 16:55) (94% - 96%)        Suspected CVA vs symptoms in setting of hypertensive urgency POA   Worsening of chronic R hemiparesis possibly stroke mimic in the setting of hyperglycemia,  symptoms resolved after BP improved   Empirically started on levetiracetam 500 mg BID x 7 days for seizure prophylaxis as per neurosurgery>>stopped   ECHO: pEF, grade 1 DD, bubble study without evidence of inter-atrial flow.  neurology following   patient unable to fit into MRI machine , will need outpatient open MRI/MRA ; prolonged cardiac monitoring as outpatient  continue with ASA, high intensity statin   PT: outpatient PT     elevated troponin , likely in setting of CVA vs demand in setting of hypertensive urgency POA   no e/o ACS   BP in triage 192/128    Uncontrolled IDDM2 with hyperglycemia  A1C 13% ; CM spoke with patient's pharmacy, patient last filled insulin and seen her PMD in 11/2023 ; has not been back since   Blood glucose 532 on admission, bicarb and anion gap WNL, U/A with glucosuria  S/p 1L NS bolus and insulin 5 U IV in the ED  Endo consulted appreciated   continue with lantus 45u QHS, lispro 15u ac  - increased lispro to 17 units pre meal   added metformin 500mg bid - states she would not want to take on discharge given diarrhea.     dizziness  resolved   orthostatics neg     HTN:  continue with current meds     HLD: PTA atorvastatin 80 mg daily     Discharge time : 40 min   RETURN PARAMETERS DISCUSSED WITH PATIENT, PATIENT EXPRESSED UNDERSTANDING AND IS AGREEABLE. DISCUSSED WITH PATIENT ON REFRAINING FROM DRIVING UNTIL FOLLOW-UP/ CLEARED BY PMD. PATIENT EXPRESSED UNDERSTANDING.   Care plan and all findings were discussed in detail with patient.  All questions and concerns addressed

## 2024-01-19 NOTE — PROGRESS NOTE ADULT - PROBLEM SELECTOR PLAN 1
Continue with the current  regimen while inpatient   decreased glucose toxicity   can be discharged on current dose/ regimen
Continue with the current  regimen while inpatient   stable finger sticks   can be discharged on current dose/ regimen   Patient should have strict diet control and do exercise as tolerated upon discharge

## 2024-01-19 NOTE — CHART NOTE - NSCHARTNOTEFT_GEN_A_CORE
Patient: Tiffanie Burrell   : 1965    To Whom It May Concern:    Please use this letter  as verfication that the above patient was hospitalized at [facility] from 24-24.  Patient may return to work 24 to her prior work duties as she tolerates.     Due to HIPAA law, I am unable to disclose any further information.     If any questions or concerns please call [phone].     Best Regards,  Mariam Nayak MD

## 2024-01-19 NOTE — DISCHARGE NOTE NURSING/CASE MANAGEMENT/SOCIAL WORK - NSDCPEFALRISK_GEN_ALL_CORE
For information on Fall & Injury Prevention, visit: [email or web address] OR  [email or web address] OR  https://www.cdc.gov/steadi/patient.html [Takes medication as prescribed] : takes [None] : Patient does not have any barriers to medication adherence

## 2024-01-19 NOTE — PROGRESS NOTE ADULT - PROVIDER SPECIALTY LIST ADULT
Hospitalist
Neurology
Endocrinology
Neurology
Endocrinology
Neurology
Neurology
Hospitalist

## 2024-01-19 NOTE — DISCHARGE NOTE NURSING/CASE MANAGEMENT/SOCIAL WORK - PATIENT PORTAL LINK FT
You can access the FollowMyHealth Patient Portal offered by [facility] by registering at the following website: [email or web address]. By joining [facility]’s FollowMyHealth portal, you will also be able to view your health information using other applications (apps) compatible with our system.

## 2024-01-19 NOTE — DISCHARGE NOTE PROVIDER - PROVIDER TOKENS
PROVIDER:[TOKEN:[[id number]],FOLLOWUP:[2 weeks]],FREE:[LAST:[your primary care doctor],PHONE:[(   )    -],FAX:[(   )    -],FOLLOWUP:[1 week]],PROVIDER:[TOKEN:[[id number]],FOLLOWUP:[2 weeks]]

## 2024-01-19 NOTE — DISCHARGE NOTE PROVIDER - CARE PROVIDER_API CALL
Narinder Mcqueen  Endocrinology/Metab/Diabetes  [address], [address]  [place] [address]  Phone: [phone]  Fax: [phone]  Follow Up Time: 2 weeks    your primary care doctor,   Phone: (   )    -  Fax: (   )    -  Follow Up Time: 1 week    Samantha Everett  Neurology  [address]  [place] [address]  Phone: [phone]  Fax: [phone]  Follow Up Time: 2 weeks

## 2024-01-19 NOTE — DISCHARGE NOTE PROVIDER - NSDCMRMEDTOKEN_GEN_ALL_CORE_FT
alcohol swabs: Apply topically to affected area 4 times a day  amLODIPine 10 mg oral tablet: 1 tab(s) orally once a day  aspirin 81 mg oral delayed release tablet: 1 tab(s) orally once a day  atorvastatin 80 mg oral tablet: 1 tab(s) orally once a day  cloNIDine 0.3 mg/24 hr transdermal film, extended release: 1 patch transdermally once a day  gabapentin 100 mg oral tablet: orally once a day  glimepiride 2 mg oral tablet: 1 tab(s) orally once a day  Insulin Pen Needles, 4mm: 1 application subcutaneously 4 times a day. ** Use with insulin pen **  labetalol 300 mg oral tablet: 1 tab(s) orally 2 times a day  lancets: 1 application subcutaneously 4 times a day  Lantus Solostar Pen 100 units/mL subcutaneous solution: 45 unit(s) subcutaneous once a day (at bedtime)  NovoLOG FlexPen 100 units/mL injectable solution: 17 unit(s) subcutaneous 3 times a day (before meals)  outpatient physical therapy: outpatient physical therapy 3x per week  senna leaf extract oral tablet: 2 tab(s) orally once a day (at bedtime)  test strips (per patient&#x27;s insurance): 1 application subcutaneously 4 times a day. ** Compatible with patient&#x27;s glucometer **

## 2024-01-19 NOTE — PROGRESS NOTE ADULT - REASON FOR ADMISSION
Suspected CVA

## 2024-01-19 NOTE — DISCHARGE NOTE PROVIDER - NSDCCPCAREPLAN_GEN_ALL_CORE_FT
PRINCIPAL DISCHARGE DIAGNOSIS  Diagnosis: Suspected cerebrovascular accident (CVA)  Assessment and Plan of Treatment:       SECONDARY DISCHARGE DIAGNOSES  Diagnosis: HTN (hypertension)  Assessment and Plan of Treatment:     Diagnosis: HLD (hyperlipidemia)  Assessment and Plan of Treatment:     Diagnosis: Type 2 diabetes mellitus with hyperglycemia, with long-term current use of insulin  Assessment and Plan of Treatment:

## 2024-01-19 NOTE — PROGRESS NOTE ADULT - SUBJECTIVE AND OBJECTIVE BOX
Neurology Progress Note    S: Patient seen and examined.     Medications: MEDICATIONS  (STANDING):  amLODIPine   Tablet 10 milliGRAM(s) Oral daily  aspirin enteric coated 81 milliGRAM(s) Oral daily  atorvastatin 80 milliGRAM(s) Oral at bedtime  cloNIDine Patch 0.3 mG/24Hr(s) 1 patch Transdermal every 7 days  dextrose 5%. 1000 milliLiter(s) (50 mL/Hr) IV Continuous <Continuous>  dextrose 5%. 1000 milliLiter(s) (100 mL/Hr) IV Continuous <Continuous>  dextrose 50% Injectable 25 Gram(s) IV Push once  dextrose 50% Injectable 25 Gram(s) IV Push once  dextrose 50% Injectable 12.5 Gram(s) IV Push once  enoxaparin Injectable 40 milliGRAM(s) SubCutaneous every 24 hours  gabapentin 100 milliGRAM(s) Oral daily  glucagon  Injectable 1 milliGRAM(s) IntraMuscular once  influenza   Vaccine 0.5 milliLiter(s) IntraMuscular once  insulin glargine Injectable (LANTUS) 45 Unit(s) SubCutaneous at bedtime  insulin lispro (ADMELOG) corrective regimen sliding scale   SubCutaneous three times a day before meals  insulin lispro (ADMELOG) corrective regimen sliding scale   SubCutaneous at bedtime  insulin lispro Injectable (ADMELOG) 17 Unit(s) SubCutaneous three times a day before meals  labetalol 300 milliGRAM(s) Oral two times a day  metFORMIN 500 milliGRAM(s) Oral two times a day  senna 2 Tablet(s) Oral at bedtime    MEDICATIONS  (PRN):  acetaminophen     Tablet .. 650 milliGRAM(s) Oral every 6 hours PRN Temp greater or equal to 38C (100.4F), Mild Pain (1 - 3)  aluminum hydroxide/magnesium hydroxide/simethicone Suspension 30 milliLiter(s) Oral every 4 hours PRN Dyspepsia  benzocaine/menthol Lozenge 1 Lozenge Oral three times a day PRN Sore Throat  dextrose Oral Gel 15 Gram(s) Oral once PRN Blood Glucose LESS THAN 70 milliGRAM(s)/deciliter  hydrALAZINE Injectable 10 milliGRAM(s) IV Push every 6 hours PRN if SBP >170 or DBP >100  melatonin 3 milliGRAM(s) Oral at bedtime PRN Insomnia  ondansetron Injectable 4 milliGRAM(s) IV Push every 8 hours PRN Nausea and/or Vomiting       Vitals:  Vital Signs Last 24 Hrs  T(C): 36.9 (19 Jan 2024 05:41), Max: 37.2 (18 Jan 2024 17:00)  T(F): 98.5 (19 Jan 2024 05:41), Max: 98.9 (18 Jan 2024 17:00)  HR: 70 (19 Jan 2024 05:41) (70 - 88)  BP: 163/91 (19 Jan 2024 05:41) (132/88 - 180/91)  BP(mean): 115 (18 Jan 2024 17:53) (115 - 115)  RR: 18 (19 Jan 2024 05:41) (18 - 18)  SpO2: 96% (19 Jan 2024 05:41) (94% - 96%)    Parameters below as of 19 Jan 2024 05:41  Patient On (Oxygen Delivery Method): room air                General Exam:   General Appearance: Appropriately dressed and in no acute distress       Head: Normocephalic, atraumatic and no dysmorphic features  Ear, Nose, and Throat: Moist mucous membranes  CVS: S1S2+  Resp: No SOB, no wheeze or rhonchi  Abd: soft NTND  Extremities: No edema, no cyanosis  Skin: No bruises, no rashes     Neurological Exam:  Mental Status: Awake, alert and oriented x 3.  Able to follow simple and complex verbal commands. Able to name and repeat. speech is dysfluent with occasional word finding difficulty.   Cranial Nerves: PERRL, EOMI,  ? L sup quadrantopia, sensation V1-V3 intact,  no obvious facial asymmetry, equal elevation of palate, scm/trap 5/5, tongue is midline on protrusion.  hearing is grossly intact.   Motor: RUE and RLE drift - RLE from arthritic pain   Sensation: Intact to light touch and pinprick throughout. no right/left confusion. no extinction to tactile on DSS.   Reflexes: 1+ throughout at biceps, brachioradialis, triceps, patellars and ankles bilaterally and equal. No clonus. R toe and L toe were both downgoing.  Coordination: No dysmetria on FNF  Gait:  deferred      LABS:    no new labs      I personally reviewed the below data/images/labs:  < from: CT Brain Stroke Protocol (01.13.24 @ 15:42) >    IMPRESSION:  HEAD CT: Mild volume loss, microvascular disease, no midline shift.  Tiny focus of hyperattenuation in the left caudate head nucleus.   Suspected cavernoma. Cannot exclude tiny hemorrhage. MRI with gadolinium   may be helpful for further evaluation, if clinically indicated.  Encephalomalacia and gliosis in the right occipital lobe.    < end of copied text >  < from: CT Head No Cont (01.13.24 @ 21:04) >  The calvarium and skull base appear within normal limits.    IMPRESSION:    Small hyperdense focus in the left caudate nucleus head remains stable.   It may be further assessed and characterized with contrast-enhanced MRI   imaging, if clinically indicated.    < end of copied text >  < from: CT Head No Cont (01.16.24 @ 12:47) >    IMPRESSION:  Left caudate punctate focus of hyperattenuation is less conspicuous on   the current exam. Otherwise, no interval change.    < end of copied text >      EEG Classification / Summary:  Abnormal EEG study  Mild shifting generalized background slowing, GRDA    -----------------------------------------------------------------------------------------------------    Clinical Impression:  Mild diffuse/multi-focal cerebral dysfunction, not specific as to etiology.  There were no epileptiform abnormalities recorded.      --------------------------------------------------------------------------    Summary:   1. Technically difficult study.   2. Normal global left ventricular systolic function.   3. Left ventricular ejection fraction, by visual estimation, is 50 to   55%.   4. Spectral Doppler shows impaired relaxation pattern of left   ventricular myocardial filling (Grade I diastolic dysfunction).   5. The left atrium is normal in size.   6. Bubble study completred without evidence of inter-atrial flow.   7. Structurally normal mitral valve, with normal leaflet excursion.   8. Structurally normal tricuspid valve, with normal leaflet excursion.

## 2024-01-19 NOTE — PROGRESS NOTE ADULT - ASSESSMENT
58F with HTN, HLD, IDDM2, and hx of stroke with residual R hemiparesis and speech difficulties here with RUE weakness and disorientation, thought to be related to hyperglycemia as  and UA with proteinuria and glucosuria  Stroke code called in ED but NIHSS deemed to be 0. CTH with hyperdensity in the L caudate head that appeared stable on 6h scan. CTH also remarkable for chronic R PCA, L basal ganglia and L  infarcts. Repeat CTH 1/16 unchanged  EEG with diffuse slowing, no sz  , a1c 13.4    Worsening of chronic R hemiparesis likely stroke mimic in the setting of hyperglycemia, r/o new ischemic event. CTH appears more consistent with cavernoma vs calcification than ICH  Chronic embolic and small vessel appearing infarcts    Plan:  - start aspirin 81   - unable to fit in MRI machine. Will get outpatient open MRI/A  - atorvastatin 80mg  - endocrine eval for A1C, blood glucose control  - bp goal normotension  - tte reviewed   - prolonged cardiac monitoring as outpatient  - can consider routine eeg but can likely hold off for now - reviewed  - pt/ot  - dvt ppx  dc planning    Samantha Everett DO  Vascular Neurology  Office [phone]

## 2024-01-19 NOTE — DISCHARGE NOTE PROVIDER - NSDCFUADDAPPT_GEN_ALL_CORE_FT
It is important to see your primary physician as well as other necessary consultants within the next week to perform a comprehensive medical review.  Call their offices for an appointment as soon as you leave the hospital.  If you do not have a primary physician or cant reach him/her, contact the [facility] Physician Referral Service at [phone].  Your medical issues appear to be stable at this time, but if your symptoms recur or worsen, contact your physicians and/or return to the hospital if necessary.  If you encounter any issues or questions with your medication, call your physicians before stopping the medication.

## 2024-01-26 DIAGNOSIS — Z79.4 LONG TERM (CURRENT) USE OF INSULIN: ICD-10-CM

## 2024-01-26 DIAGNOSIS — I24.89 OTHER FORMS OF ACUTE ISCHEMIC HEART DISEASE: ICD-10-CM

## 2024-01-26 DIAGNOSIS — I67.4 HYPERTENSIVE ENCEPHALOPATHY: ICD-10-CM

## 2024-01-26 DIAGNOSIS — E11.65 TYPE 2 DIABETES MELLITUS WITH HYPERGLYCEMIA: ICD-10-CM

## 2024-01-26 DIAGNOSIS — I16.0 HYPERTENSIVE URGENCY: ICD-10-CM

## 2024-01-26 DIAGNOSIS — G93.89 OTHER SPECIFIED DISORDERS OF BRAIN: ICD-10-CM

## 2024-01-26 DIAGNOSIS — R29.700 NIHSS SCORE 0: ICD-10-CM
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
## 2024-01-26 DIAGNOSIS — R41.82 ALTERED MENTAL STATUS, UNSPECIFIED: ICD-10-CM

## 2024-01-26 DIAGNOSIS — I63.9 CEREBRAL INFARCTION, UNSPECIFIED: ICD-10-CM

## 2024-01-26 DIAGNOSIS — T38.3X6A UNDERDOSING OF INSULIN AND ORAL HYPOGLYCEMIC [ANTIDIABETIC] DRUGS, INITIAL ENCOUNTER: ICD-10-CM

## 2024-01-26 DIAGNOSIS — I69.328 OTHER SPEECH AND LANGUAGE DEFICITS FOLLOWING CEREBRAL INFARCTION: ICD-10-CM

## 2024-01-26 DIAGNOSIS — I69.351 HEMIPLEGIA AND HEMIPARESIS FOLLOWING CEREBRAL INFARCTION AFFECTING RIGHT DOMINANT SIDE: ICD-10-CM

## 2024-01-26 DIAGNOSIS — I69.398 OTHER SEQUELAE OF CEREBRAL INFARCTION: ICD-10-CM

## 2024-01-26 DIAGNOSIS — E78.5 HYPERLIPIDEMIA, UNSPECIFIED: ICD-10-CM

## 2024-01-26 DIAGNOSIS — R42 DIZZINESS AND GIDDINESS: ICD-10-CM

## 2024-01-26 DIAGNOSIS — I10 ESSENTIAL (PRIMARY) HYPERTENSION: ICD-10-CM

## 2025-03-01 NOTE — ED ADULT TRIAGE NOTE - PATIENT ON (OXYGEN DELIVERY METHOD)
Lucille,    Thank you for trusting [facility] with your care. Your decision to come to us means a lot and we are honored to be part of your healthcare journey. We value your trust and hope your experience with us was positive and met your expectations.    We're always looking for ways to improve, and your feedback is incredibly important to us. You will receive a text or email soon asking you how your visit went. for If you could take a moment to share your thoughts, it would mean the world to us. Your input helps us better serve you and others in the community.     Thank you again for choosing us. We're grateful for the opportunity to care for you and your loved ones. We hope to see you again - though we always wish you health and wellness!    Warm regards,    [facility] Team    Santos Danw PA-C, Cary Souza, Thelma, Radiation Tech, Medical Assistant, and Cary Galarza + Medical Assistant      Continue providing the previously prescribed Amoxicillin until completed for the full treatment of the previously diagnosed Strep throat.  Bromfed prescribed for cough and congestion relief   Do not take other decongestants or cough medications while on this medication.    Recommend OTC treatment for symptoms:  ibuprofen (Advil, Motrin) and acetaminophen (Tylenol) for fevers and pain relief.  decongestants (specifically pseudoephedrine - found behind the pharmacy counter - does not need a prescription) <avoid if you have a history of high blood pressure or heart conditions>, along with antihistamines (Claritin, Zyrtec, Allegra, or Xyzal) and nasal steroid sprays (such as Flonase) to help with nasal congestion and runny nose.  guaifenesin (Mucinex) can help with thinning out mucus which can help with chest congestion or with relieving persistent sinus pressure  honey (1-2 teaspoons every hour) for relief of throat irritation and coughing fits.  warm teas, humidifiers, nasal  room air